# Patient Record
Sex: MALE | Race: WHITE | NOT HISPANIC OR LATINO
[De-identification: names, ages, dates, MRNs, and addresses within clinical notes are randomized per-mention and may not be internally consistent; named-entity substitution may affect disease eponyms.]

---

## 2017-12-28 PROBLEM — Z00.00 ENCOUNTER FOR PREVENTIVE HEALTH EXAMINATION: Status: ACTIVE | Noted: 2017-12-28

## 2018-01-03 ENCOUNTER — APPOINTMENT (OUTPATIENT)
Dept: UROLOGY | Facility: CLINIC | Age: 48
End: 2018-01-03
Payer: MEDICAID

## 2018-01-03 VITALS
TEMPERATURE: 97.5 F | RESPIRATION RATE: 17 BRPM | DIASTOLIC BLOOD PRESSURE: 82 MMHG | OXYGEN SATURATION: 96 % | HEIGHT: 70 IN | HEART RATE: 88 BPM | WEIGHT: 280 LBS | BODY MASS INDEX: 40.09 KG/M2 | SYSTOLIC BLOOD PRESSURE: 130 MMHG

## 2018-01-03 DIAGNOSIS — F20.1 DISORGANIZED SCHIZOPHRENIA: ICD-10-CM

## 2018-01-03 DIAGNOSIS — F17.200 NICOTINE DEPENDENCE, UNSPECIFIED, UNCOMPLICATED: ICD-10-CM

## 2018-01-03 DIAGNOSIS — Z78.9 OTHER SPECIFIED HEALTH STATUS: ICD-10-CM

## 2018-01-03 DIAGNOSIS — Z80.1 FAMILY HISTORY OF MALIGNANT NEOPLASM OF TRACHEA, BRONCHUS AND LUNG: ICD-10-CM

## 2018-01-03 DIAGNOSIS — Z80.3 FAMILY HISTORY OF MALIGNANT NEOPLASM OF BREAST: ICD-10-CM

## 2018-01-03 PROCEDURE — 99213 OFFICE O/P EST LOW 20 MIN: CPT

## 2018-01-11 PROBLEM — Z80.1 FAMILY HISTORY OF LUNG CANCER: Status: ACTIVE | Noted: 2018-01-03

## 2018-01-11 PROBLEM — F20.1 DISORGANIZED SCHIZOPHRENIA IN REMISSION: Status: RESOLVED | Noted: 2018-01-03 | Resolved: 2018-01-11

## 2018-01-11 PROBLEM — F17.200 CURRENT SOME DAY SMOKER: Status: ACTIVE | Noted: 2018-01-03

## 2018-01-11 PROBLEM — Z80.3 FAMILY HISTORY OF MALIGNANT NEOPLASM OF BREAST: Status: ACTIVE | Noted: 2018-01-03

## 2018-01-11 PROBLEM — Z78.9 CONSUMES ALCOHOL OCCASIONALLY: Status: ACTIVE | Noted: 2018-01-03

## 2018-01-25 ENCOUNTER — MEDICATION RENEWAL (OUTPATIENT)
Age: 48
End: 2018-01-25

## 2018-04-04 ENCOUNTER — APPOINTMENT (OUTPATIENT)
Dept: UROLOGY | Facility: CLINIC | Age: 48
End: 2018-04-04
Payer: MEDICAID

## 2018-04-04 VITALS
OXYGEN SATURATION: 98 % | DIASTOLIC BLOOD PRESSURE: 88 MMHG | HEART RATE: 84 BPM | SYSTOLIC BLOOD PRESSURE: 144 MMHG | RESPIRATION RATE: 16 BRPM

## 2018-04-04 PROCEDURE — 99213 OFFICE O/P EST LOW 20 MIN: CPT

## 2018-05-07 ENCOUNTER — APPOINTMENT (OUTPATIENT)
Dept: ORTHOPEDIC SURGERY | Facility: CLINIC | Age: 48
End: 2018-05-07

## 2018-06-04 ENCOUNTER — APPOINTMENT (OUTPATIENT)
Dept: ORTHOPEDIC SURGERY | Facility: CLINIC | Age: 48
End: 2018-06-04
Payer: MEDICAID

## 2018-06-04 VITALS
BODY MASS INDEX: 40.8 KG/M2 | HEART RATE: 105 BPM | SYSTOLIC BLOOD PRESSURE: 139 MMHG | DIASTOLIC BLOOD PRESSURE: 83 MMHG | WEIGHT: 285 LBS | HEIGHT: 70 IN

## 2018-06-04 PROCEDURE — 99204 OFFICE O/P NEW MOD 45 MIN: CPT | Mod: 25

## 2018-06-04 PROCEDURE — 20611 DRAIN/INJ JOINT/BURSA W/US: CPT | Mod: RT

## 2018-06-04 PROCEDURE — 73564 X-RAY EXAM KNEE 4 OR MORE: CPT | Mod: RT

## 2018-06-04 RX ORDER — METRONIDAZOLE 500 MG/1
TABLET ORAL
Refills: 0 | Status: ACTIVE | COMMUNITY

## 2018-06-18 ENCOUNTER — MEDICATION RENEWAL (OUTPATIENT)
Age: 48
End: 2018-06-18

## 2018-07-01 ENCOUNTER — OUTPATIENT (OUTPATIENT)
Dept: OUTPATIENT SERVICES | Facility: HOSPITAL | Age: 48
LOS: 1 days | End: 2018-07-01
Payer: MEDICAID

## 2018-07-11 ENCOUNTER — APPOINTMENT (OUTPATIENT)
Dept: ORTHOPEDIC SURGERY | Facility: CLINIC | Age: 48
End: 2018-07-11

## 2018-07-16 DIAGNOSIS — Z71.89 OTHER SPECIFIED COUNSELING: ICD-10-CM

## 2018-09-01 ENCOUNTER — OUTPATIENT (OUTPATIENT)
Dept: OUTPATIENT SERVICES | Facility: HOSPITAL | Age: 48
LOS: 1 days | End: 2018-09-01

## 2018-10-05 ENCOUNTER — APPOINTMENT (OUTPATIENT)
Dept: UROLOGY | Facility: CLINIC | Age: 48
End: 2018-10-05

## 2018-10-26 ENCOUNTER — APPOINTMENT (OUTPATIENT)
Dept: UROLOGY | Facility: CLINIC | Age: 48
End: 2018-10-26

## 2018-11-02 ENCOUNTER — INPATIENT (INPATIENT)
Facility: HOSPITAL | Age: 48
LOS: 20 days | Discharge: HOME | End: 2018-11-23
Attending: PSYCHIATRY & NEUROLOGY | Admitting: PSYCHIATRY & NEUROLOGY
Payer: MEDICAID

## 2018-11-02 VITALS
OXYGEN SATURATION: 99 % | HEART RATE: 93 BPM | TEMPERATURE: 98 F | RESPIRATION RATE: 18 BRPM | SYSTOLIC BLOOD PRESSURE: 144 MMHG | DIASTOLIC BLOOD PRESSURE: 79 MMHG

## 2018-11-02 DIAGNOSIS — F20.5 RESIDUAL SCHIZOPHRENIA: ICD-10-CM

## 2018-11-02 DIAGNOSIS — R69 ILLNESS, UNSPECIFIED: ICD-10-CM

## 2018-11-02 DIAGNOSIS — F25.9 SCHIZOAFFECTIVE DISORDER, UNSPECIFIED: ICD-10-CM

## 2018-11-02 LAB
ALBUMIN SERPL ELPH-MCNC: 4.9 G/DL — SIGNIFICANT CHANGE UP (ref 3.3–5)
ALP SERPL-CCNC: 101 U/L — SIGNIFICANT CHANGE UP (ref 40–120)
ALT FLD-CCNC: 34 U/L — SIGNIFICANT CHANGE UP (ref 4–41)
AMPHET UR-MCNC: NEGATIVE — SIGNIFICANT CHANGE UP
APAP SERPL-MCNC: < 15 UG/ML — LOW (ref 15–25)
APPEARANCE UR: CLEAR — SIGNIFICANT CHANGE UP
AST SERPL-CCNC: 25 U/L — SIGNIFICANT CHANGE UP (ref 4–40)
BARBITURATES UR SCN-MCNC: NEGATIVE — SIGNIFICANT CHANGE UP
BASOPHILS # BLD AUTO: 0.08 K/UL — SIGNIFICANT CHANGE UP (ref 0–0.2)
BASOPHILS NFR BLD AUTO: 1 % — SIGNIFICANT CHANGE UP (ref 0–2)
BENZODIAZ UR-MCNC: NEGATIVE — SIGNIFICANT CHANGE UP
BILIRUB SERPL-MCNC: 0.4 MG/DL — SIGNIFICANT CHANGE UP (ref 0.2–1.2)
BILIRUB UR-MCNC: NEGATIVE — SIGNIFICANT CHANGE UP
BLOOD UR QL VISUAL: NEGATIVE — SIGNIFICANT CHANGE UP
BUN SERPL-MCNC: 11 MG/DL — SIGNIFICANT CHANGE UP (ref 7–23)
CALCIUM SERPL-MCNC: 10.3 MG/DL — SIGNIFICANT CHANGE UP (ref 8.4–10.5)
CANNABINOIDS UR-MCNC: NEGATIVE — SIGNIFICANT CHANGE UP
CHLORIDE SERPL-SCNC: 101 MMOL/L — SIGNIFICANT CHANGE UP (ref 98–107)
CO2 SERPL-SCNC: 22 MMOL/L — SIGNIFICANT CHANGE UP (ref 22–31)
COCAINE METAB.OTHER UR-MCNC: NEGATIVE — SIGNIFICANT CHANGE UP
COLOR SPEC: SIGNIFICANT CHANGE UP
CREAT SERPL-MCNC: 0.77 MG/DL — SIGNIFICANT CHANGE UP (ref 0.5–1.3)
EOSINOPHIL # BLD AUTO: 0.12 K/UL — SIGNIFICANT CHANGE UP (ref 0–0.5)
EOSINOPHIL NFR BLD AUTO: 1.5 % — SIGNIFICANT CHANGE UP (ref 0–6)
ETHANOL BLD-MCNC: < 10 MG/DL — SIGNIFICANT CHANGE UP
GLUCOSE SERPL-MCNC: 91 MG/DL — SIGNIFICANT CHANGE UP (ref 70–99)
GLUCOSE UR-MCNC: NEGATIVE — SIGNIFICANT CHANGE UP
HCT VFR BLD CALC: 45 % — SIGNIFICANT CHANGE UP (ref 39–50)
HGB BLD-MCNC: 15.2 G/DL — SIGNIFICANT CHANGE UP (ref 13–17)
IMM GRANULOCYTES # BLD AUTO: 0.02 # — SIGNIFICANT CHANGE UP
IMM GRANULOCYTES NFR BLD AUTO: 0.3 % — SIGNIFICANT CHANGE UP (ref 0–1.5)
KETONES UR-MCNC: NEGATIVE — SIGNIFICANT CHANGE UP
LEUKOCYTE ESTERASE UR-ACNC: NEGATIVE — SIGNIFICANT CHANGE UP
LYMPHOCYTES # BLD AUTO: 2.21 K/UL — SIGNIFICANT CHANGE UP (ref 1–3.3)
LYMPHOCYTES # BLD AUTO: 28.4 % — SIGNIFICANT CHANGE UP (ref 13–44)
MCHC RBC-ENTMCNC: 28 PG — SIGNIFICANT CHANGE UP (ref 27–34)
MCHC RBC-ENTMCNC: 33.8 % — SIGNIFICANT CHANGE UP (ref 32–36)
MCV RBC AUTO: 83 FL — SIGNIFICANT CHANGE UP (ref 80–100)
METHADONE UR-MCNC: NEGATIVE — SIGNIFICANT CHANGE UP
MONOCYTES # BLD AUTO: 0.53 K/UL — SIGNIFICANT CHANGE UP (ref 0–0.9)
MONOCYTES NFR BLD AUTO: 6.8 % — SIGNIFICANT CHANGE UP (ref 2–14)
NEUTROPHILS # BLD AUTO: 4.81 K/UL — SIGNIFICANT CHANGE UP (ref 1.8–7.4)
NEUTROPHILS NFR BLD AUTO: 62 % — SIGNIFICANT CHANGE UP (ref 43–77)
NITRITE UR-MCNC: NEGATIVE — SIGNIFICANT CHANGE UP
NRBC # FLD: 0 — SIGNIFICANT CHANGE UP
OPIATES UR-MCNC: NEGATIVE — SIGNIFICANT CHANGE UP
OXYCODONE UR-MCNC: NEGATIVE — SIGNIFICANT CHANGE UP
PCP UR-MCNC: NEGATIVE — SIGNIFICANT CHANGE UP
PH UR: 6 — SIGNIFICANT CHANGE UP (ref 5–8)
PLATELET # BLD AUTO: 229 K/UL — SIGNIFICANT CHANGE UP (ref 150–400)
PMV BLD: 8.6 FL — SIGNIFICANT CHANGE UP (ref 7–13)
POTASSIUM SERPL-MCNC: 4 MMOL/L — SIGNIFICANT CHANGE UP (ref 3.5–5.3)
POTASSIUM SERPL-SCNC: 4 MMOL/L — SIGNIFICANT CHANGE UP (ref 3.5–5.3)
PROT SERPL-MCNC: 8.2 G/DL — SIGNIFICANT CHANGE UP (ref 6–8.3)
PROT UR-MCNC: NEGATIVE — SIGNIFICANT CHANGE UP
RBC # BLD: 5.42 M/UL — SIGNIFICANT CHANGE UP (ref 4.2–5.8)
RBC # FLD: 13 % — SIGNIFICANT CHANGE UP (ref 10.3–14.5)
SALICYLATES SERPL-MCNC: < 5 MG/DL — LOW (ref 15–30)
SODIUM SERPL-SCNC: 139 MMOL/L — SIGNIFICANT CHANGE UP (ref 135–145)
SP GR SPEC: 1.01 — SIGNIFICANT CHANGE UP (ref 1–1.04)
TSH SERPL-MCNC: 1.57 UIU/ML — SIGNIFICANT CHANGE UP (ref 0.27–4.2)
UROBILINOGEN FLD QL: NORMAL — SIGNIFICANT CHANGE UP
WBC # BLD: 7.77 K/UL — SIGNIFICANT CHANGE UP (ref 3.8–10.5)
WBC # FLD AUTO: 7.77 K/UL — SIGNIFICANT CHANGE UP (ref 3.8–10.5)

## 2018-11-02 PROCEDURE — 99285 EMERGENCY DEPT VISIT HI MDM: CPT

## 2018-11-02 RX ORDER — IBUPROFEN 200 MG
400 TABLET ORAL ONCE
Qty: 0 | Refills: 0 | Status: COMPLETED | OUTPATIENT
Start: 2018-11-02 | End: 2018-11-02

## 2018-11-02 RX ORDER — ALBUTEROL 90 UG/1
2 AEROSOL, METERED ORAL EVERY 6 HOURS
Qty: 0 | Refills: 0 | Status: DISCONTINUED | OUTPATIENT
Start: 2018-11-02 | End: 2018-11-23

## 2018-11-02 RX ORDER — OXYBUTYNIN CHLORIDE 5 MG
5 TABLET ORAL AT BEDTIME
Qty: 0 | Refills: 0 | Status: DISCONTINUED | OUTPATIENT
Start: 2018-11-02 | End: 2018-11-23

## 2018-11-02 RX ADMIN — Medication 5 MILLIGRAM(S): at 21:25

## 2018-11-02 RX ADMIN — Medication 400 MILLIGRAM(S): at 23:53

## 2018-11-02 RX ADMIN — Medication 400 MILLIGRAM(S): at 22:30
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
## 2018-11-02 NOTE — ED ADULT NURSE NOTE - OBJECTIVE STATEMENT
Patient received in  c/o aggressive behavior and AH. Patient denies SI, when asked about HI patient stated "I just wanna hurt him, and I keep having that feeling". Patient denies ETOH or substance use. psych eval ongoing
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
## 2018-11-02 NOTE — ED BEHAVIORAL HEALTH ASSESSMENT NOTE - DESCRIPTION
cooperative, in behavioral control    Vital Signs Last 24 Hrs  T(C): 36.7 (02 Nov 2018 12:03), Max: 36.7 (02 Nov 2018 12:03)  T(F): 98 (02 Nov 2018 12:03), Max: 98 (02 Nov 2018 12:03)  HR: 93 (02 Nov 2018 12:03) (93 - 93)  BP: 144/79 (02 Nov 2018 12:03) (144/79 - 144/79)  BP(mean): --  RR: 18 (02 Nov 2018 12:03) (18 - 18)  SpO2: 99% (02 Nov 2018 12:03) (99% - 99%) asthma see HPI
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
## 2018-11-02 NOTE — ED BEHAVIORAL HEALTH ASSESSMENT NOTE - SUICIDE RISK FACTORS
Agitation/severe anxiety/Access to means (pills, firearms, etc.)/Unable to engage in safety planning

## 2018-11-02 NOTE — ED PROVIDER NOTE - CPE EDP GASTRO NORM
Talked with patient and informed the below information. Patient stated she will wait to schedule with Dr. Dominguez when he gets back on 10/16/2017. No further questions or concerns.   normal...
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
## 2018-11-03 PROCEDURE — 99222 1ST HOSP IP/OBS MODERATE 55: CPT

## 2018-11-03 RX ORDER — DOCUSATE SODIUM 100 MG
100 CAPSULE ORAL
Qty: 0 | Refills: 0 | Status: DISCONTINUED | OUTPATIENT
Start: 2018-11-03 | End: 2018-11-23

## 2018-11-03 RX ORDER — SENNA PLUS 8.6 MG/1
1 TABLET ORAL
Qty: 0 | Refills: 0 | Status: DISCONTINUED | OUTPATIENT
Start: 2018-11-03 | End: 2018-11-23

## 2018-11-03 RX ORDER — ARIPIPRAZOLE 15 MG/1
5 TABLET ORAL DAILY
Qty: 0 | Refills: 0 | Status: DISCONTINUED | OUTPATIENT
Start: 2018-11-03 | End: 2018-11-07

## 2018-11-03 RX ADMIN — Medication 100 MILLIGRAM(S): at 09:10

## 2018-11-03 RX ADMIN — SENNA PLUS 1 TABLET(S): 8.6 TABLET ORAL at 09:11

## 2018-11-03 RX ADMIN — Medication 5 MILLIGRAM(S): at 21:42

## 2018-11-04 PROCEDURE — 99232 SBSQ HOSP IP/OBS MODERATE 35: CPT

## 2018-11-04 RX ADMIN — Medication 5 MILLIGRAM(S): at 21:02

## 2018-11-04 RX ADMIN — ARIPIPRAZOLE 5 MILLIGRAM(S): 15 TABLET ORAL at 08:52

## 2018-11-05 RX ADMIN — Medication 5 MILLIGRAM(S): at 20:34

## 2018-11-05 RX ADMIN — ARIPIPRAZOLE 5 MILLIGRAM(S): 15 TABLET ORAL at 08:45

## 2018-11-06 RX ORDER — ASENAPINE MALEATE 10 MG/1
5 TABLET SUBLINGUAL
Qty: 0 | Refills: 0 | Status: DISCONTINUED | OUTPATIENT
Start: 2018-11-06 | End: 2018-11-09

## 2018-11-06 RX ORDER — POLYETHYLENE GLYCOL 3350 17 G/17G
17 POWDER, FOR SOLUTION ORAL DAILY
Qty: 0 | Refills: 0 | Status: DISCONTINUED | OUTPATIENT
Start: 2018-11-06 | End: 2018-11-23

## 2018-11-06 RX ORDER — IBUPROFEN 200 MG
400 TABLET ORAL EVERY 6 HOURS
Qty: 0 | Refills: 0 | Status: DISCONTINUED | OUTPATIENT
Start: 2018-11-06 | End: 2018-11-08

## 2018-11-06 RX ORDER — IBUPROFEN 200 MG
400 TABLET ORAL EVERY 6 HOURS
Qty: 0 | Refills: 0 | Status: DISCONTINUED | OUTPATIENT
Start: 2018-11-06 | End: 2018-11-06

## 2018-11-06 RX ORDER — PHENYLEPHRINE-SHARK LIVER OIL-MINERAL OIL-PETROLATUM RECTAL OINTMENT
1 OINTMENT (GRAM) RECTAL
Qty: 0 | Refills: 0 | Status: DISCONTINUED | OUTPATIENT
Start: 2018-11-06 | End: 2018-11-23

## 2018-11-06 RX ADMIN — ARIPIPRAZOLE 5 MILLIGRAM(S): 15 TABLET ORAL at 09:00

## 2018-11-06 RX ADMIN — Medication 5 MILLIGRAM(S): at 20:23

## 2018-11-06 RX ADMIN — Medication 100 MILLIGRAM(S): at 09:24

## 2018-11-06 RX ADMIN — ASENAPINE MALEATE 5 MILLIGRAM(S): 10 TABLET SUBLINGUAL at 20:23

## 2018-11-06 RX ADMIN — POLYETHYLENE GLYCOL 3350 17 GRAM(S): 17 POWDER, FOR SOLUTION ORAL at 14:44

## 2018-11-07 ENCOUNTER — APPOINTMENT (OUTPATIENT)
Dept: UROLOGY | Facility: CLINIC | Age: 48
End: 2018-11-07

## 2018-11-07 RX ORDER — LITHIUM CARBONATE 300 MG/1
300 TABLET, EXTENDED RELEASE ORAL AT BEDTIME
Qty: 0 | Refills: 0 | Status: DISCONTINUED | OUTPATIENT
Start: 2018-11-07 | End: 2018-11-09

## 2018-11-07 RX ADMIN — LITHIUM CARBONATE 300 MILLIGRAM(S): 300 TABLET, EXTENDED RELEASE ORAL at 20:20

## 2018-11-07 RX ADMIN — POLYETHYLENE GLYCOL 3350 17 GRAM(S): 17 POWDER, FOR SOLUTION ORAL at 08:21

## 2018-11-07 RX ADMIN — Medication 5 MILLIGRAM(S): at 20:20

## 2018-11-07 RX ADMIN — Medication 100 MILLIGRAM(S): at 08:21

## 2018-11-07 RX ADMIN — ASENAPINE MALEATE 5 MILLIGRAM(S): 10 TABLET SUBLINGUAL at 20:20

## 2018-11-07 RX ADMIN — ARIPIPRAZOLE 5 MILLIGRAM(S): 15 TABLET ORAL at 08:20

## 2018-11-08 RX ORDER — SULINDAC 200 MG/1
150 TABLET ORAL EVERY 12 HOURS
Qty: 0 | Refills: 0 | Status: DISCONTINUED | OUTPATIENT
Start: 2018-11-08 | End: 2018-11-09

## 2018-11-08 RX ORDER — DIPHENHYDRAMINE HCL 50 MG
50 CAPSULE ORAL ONCE
Qty: 0 | Refills: 0 | Status: COMPLETED | OUTPATIENT
Start: 2018-11-08 | End: 2018-11-08

## 2018-11-08 RX ADMIN — Medication 5 MILLIGRAM(S): at 20:07

## 2018-11-08 RX ADMIN — ASENAPINE MALEATE 5 MILLIGRAM(S): 10 TABLET SUBLINGUAL at 20:06

## 2018-11-08 RX ADMIN — SULINDAC 150 MILLIGRAM(S): 200 TABLET ORAL at 12:42

## 2018-11-08 RX ADMIN — Medication 50 MILLIGRAM(S): at 23:23

## 2018-11-08 RX ADMIN — LITHIUM CARBONATE 300 MILLIGRAM(S): 300 TABLET, EXTENDED RELEASE ORAL at 20:07

## 2018-11-09 RX ORDER — ASENAPINE MALEATE 10 MG/1
10 TABLET SUBLINGUAL
Qty: 0 | Refills: 0 | Status: DISCONTINUED | OUTPATIENT
Start: 2018-11-09 | End: 2018-11-15

## 2018-11-09 RX ORDER — VALPROIC ACID (AS SODIUM SALT) 250 MG/5ML
250 SOLUTION, ORAL ORAL AT BEDTIME
Qty: 0 | Refills: 0 | Status: DISCONTINUED | OUTPATIENT
Start: 2018-11-09 | End: 2018-11-13

## 2018-11-09 RX ORDER — IBUPROFEN 200 MG
400 TABLET ORAL EVERY 6 HOURS
Qty: 0 | Refills: 0 | Status: DISCONTINUED | OUTPATIENT
Start: 2018-11-09 | End: 2018-11-23

## 2018-11-09 RX ADMIN — ASENAPINE MALEATE 10 MILLIGRAM(S): 10 TABLET SUBLINGUAL at 20:28

## 2018-11-09 RX ADMIN — Medication 5 MILLIGRAM(S): at 20:28

## 2018-11-09 RX ADMIN — Medication 250 MILLIGRAM(S): at 20:28

## 2018-11-10 RX ADMIN — PHENYLEPHRINE-SHARK LIVER OIL-MINERAL OIL-PETROLATUM RECTAL OINTMENT 1 APPLICATION(S): at 21:43

## 2018-11-10 RX ADMIN — Medication 5 MILLIGRAM(S): at 21:43

## 2018-11-10 RX ADMIN — PHENYLEPHRINE-SHARK LIVER OIL-MINERAL OIL-PETROLATUM RECTAL OINTMENT 1 APPLICATION(S): at 10:32

## 2018-11-10 RX ADMIN — Medication 400 MILLIGRAM(S): at 11:20

## 2018-11-10 RX ADMIN — Medication 250 MILLIGRAM(S): at 21:43

## 2018-11-10 RX ADMIN — ASENAPINE MALEATE 10 MILLIGRAM(S): 10 TABLET SUBLINGUAL at 21:43

## 2018-11-11 RX ADMIN — Medication 400 MILLIGRAM(S): at 18:01

## 2018-11-11 RX ADMIN — PHENYLEPHRINE-SHARK LIVER OIL-MINERAL OIL-PETROLATUM RECTAL OINTMENT 1 APPLICATION(S): at 11:14

## 2018-11-11 RX ADMIN — Medication 5 MILLIGRAM(S): at 21:29

## 2018-11-11 RX ADMIN — Medication 250 MILLIGRAM(S): at 21:29

## 2018-11-11 RX ADMIN — PHENYLEPHRINE-SHARK LIVER OIL-MINERAL OIL-PETROLATUM RECTAL OINTMENT 1 APPLICATION(S): at 21:29

## 2018-11-11 RX ADMIN — ASENAPINE MALEATE 10 MILLIGRAM(S): 10 TABLET SUBLINGUAL at 21:29

## 2018-11-12 RX ADMIN — Medication 250 MILLIGRAM(S): at 20:51

## 2018-11-12 RX ADMIN — Medication 5 MILLIGRAM(S): at 20:50

## 2018-11-12 RX ADMIN — ASENAPINE MALEATE 10 MILLIGRAM(S): 10 TABLET SUBLINGUAL at 20:50

## 2018-11-13 RX ORDER — VALPROIC ACID (AS SODIUM SALT) 250 MG/5ML
250 SOLUTION, ORAL ORAL DAILY
Qty: 0 | Refills: 0 | Status: DISCONTINUED | OUTPATIENT
Start: 2018-11-13 | End: 2018-11-14

## 2018-11-13 RX ORDER — ASENAPINE MALEATE 10 MG/1
5 TABLET SUBLINGUAL
Qty: 0 | Refills: 0 | Status: DISCONTINUED | OUTPATIENT
Start: 2018-11-13 | End: 2018-11-15

## 2018-11-13 RX ADMIN — ASENAPINE MALEATE 10 MILLIGRAM(S): 10 TABLET SUBLINGUAL at 21:17

## 2018-11-13 RX ADMIN — Medication 5 MILLIGRAM(S): at 21:17

## 2018-11-14 RX ORDER — DIPHENHYDRAMINE HCL 50 MG
50 CAPSULE ORAL ONCE
Qty: 0 | Refills: 0 | Status: COMPLETED | OUTPATIENT
Start: 2018-11-14 | End: 2018-11-14

## 2018-11-14 RX ORDER — DIVALPROEX SODIUM 500 MG/1
250 TABLET, DELAYED RELEASE ORAL DAILY
Qty: 0 | Refills: 0 | Status: DISCONTINUED | OUTPATIENT
Start: 2018-11-14 | End: 2018-11-16

## 2018-11-14 RX ADMIN — Medication 5 MILLIGRAM(S): at 20:26

## 2018-11-14 RX ADMIN — ASENAPINE MALEATE 5 MILLIGRAM(S): 10 TABLET SUBLINGUAL at 08:23

## 2018-11-14 RX ADMIN — Medication 100 MILLIGRAM(S): at 08:22

## 2018-11-14 RX ADMIN — ASENAPINE MALEATE 10 MILLIGRAM(S): 10 TABLET SUBLINGUAL at 20:26

## 2018-11-14 RX ADMIN — Medication 50 MILLIGRAM(S): at 23:20

## 2018-11-15 RX ORDER — ASENAPINE MALEATE 10 MG/1
10 TABLET SUBLINGUAL
Qty: 0 | Refills: 0 | Status: COMPLETED | OUTPATIENT
Start: 2018-11-15 | End: 2018-11-15

## 2018-11-15 RX ORDER — ASENAPINE MALEATE 10 MG/1
15 TABLET SUBLINGUAL
Qty: 0 | Refills: 0 | Status: DISCONTINUED | OUTPATIENT
Start: 2018-11-16 | End: 2018-11-23

## 2018-11-15 RX ADMIN — ASENAPINE MALEATE 10 MILLIGRAM(S): 10 TABLET SUBLINGUAL at 21:09

## 2018-11-15 RX ADMIN — DIVALPROEX SODIUM 250 MILLIGRAM(S): 500 TABLET, DELAYED RELEASE ORAL at 08:53

## 2018-11-15 RX ADMIN — Medication 5 MILLIGRAM(S): at 21:09

## 2018-11-15 RX ADMIN — ASENAPINE MALEATE 5 MILLIGRAM(S): 10 TABLET SUBLINGUAL at 08:53

## 2018-11-16 RX ORDER — DIVALPROEX SODIUM 500 MG/1
500 TABLET, DELAYED RELEASE ORAL AT BEDTIME
Qty: 0 | Refills: 0 | Status: DISCONTINUED | OUTPATIENT
Start: 2018-11-16 | End: 2018-11-17

## 2018-11-16 RX ADMIN — PHENYLEPHRINE-SHARK LIVER OIL-MINERAL OIL-PETROLATUM RECTAL OINTMENT 1 APPLICATION(S): at 20:37

## 2018-11-16 RX ADMIN — DIVALPROEX SODIUM 250 MILLIGRAM(S): 500 TABLET, DELAYED RELEASE ORAL at 08:44

## 2018-11-16 RX ADMIN — DIVALPROEX SODIUM 500 MILLIGRAM(S): 500 TABLET, DELAYED RELEASE ORAL at 20:37

## 2018-11-16 RX ADMIN — ASENAPINE MALEATE 15 MILLIGRAM(S): 10 TABLET SUBLINGUAL at 20:37

## 2018-11-16 RX ADMIN — Medication 5 MILLIGRAM(S): at 20:37

## 2018-11-17 RX ORDER — DIVALPROEX SODIUM 500 MG/1
500 TABLET, DELAYED RELEASE ORAL DAILY
Qty: 0 | Refills: 0 | Status: DISCONTINUED | OUTPATIENT
Start: 2018-11-18 | End: 2018-11-19

## 2018-11-17 RX ORDER — DIVALPROEX SODIUM 500 MG/1
500 TABLET, DELAYED RELEASE ORAL ONCE
Qty: 0 | Refills: 0 | Status: COMPLETED | OUTPATIENT
Start: 2018-11-17 | End: 2018-11-17

## 2018-11-17 RX ADMIN — Medication 400 MILLIGRAM(S): at 09:07

## 2018-11-17 RX ADMIN — Medication 400 MILLIGRAM(S): at 09:06

## 2018-11-17 RX ADMIN — Medication 5 MILLIGRAM(S): at 21:12

## 2018-11-17 RX ADMIN — DIVALPROEX SODIUM 500 MILLIGRAM(S): 500 TABLET, DELAYED RELEASE ORAL at 12:49

## 2018-11-17 RX ADMIN — ASENAPINE MALEATE 15 MILLIGRAM(S): 10 TABLET SUBLINGUAL at 21:12

## 2018-11-18 RX ADMIN — Medication 5 MILLIGRAM(S): at 20:53

## 2018-11-18 RX ADMIN — POLYETHYLENE GLYCOL 3350 17 GRAM(S): 17 POWDER, FOR SOLUTION ORAL at 09:49

## 2018-11-18 RX ADMIN — DIVALPROEX SODIUM 500 MILLIGRAM(S): 500 TABLET, DELAYED RELEASE ORAL at 09:48

## 2018-11-18 RX ADMIN — ASENAPINE MALEATE 15 MILLIGRAM(S): 10 TABLET SUBLINGUAL at 20:53

## 2018-11-19 RX ORDER — DIVALPROEX SODIUM 500 MG/1
250 TABLET, DELAYED RELEASE ORAL DAILY
Qty: 0 | Refills: 0 | Status: DISCONTINUED | OUTPATIENT
Start: 2018-11-19 | End: 2018-11-23

## 2018-11-19 RX ADMIN — ASENAPINE MALEATE 15 MILLIGRAM(S): 10 TABLET SUBLINGUAL at 22:00

## 2018-11-19 RX ADMIN — Medication 5 MILLIGRAM(S): at 22:00

## 2018-11-20 RX ADMIN — DIVALPROEX SODIUM 250 MILLIGRAM(S): 500 TABLET, DELAYED RELEASE ORAL at 10:42

## 2018-11-20 RX ADMIN — Medication 5 MILLIGRAM(S): at 21:17

## 2018-11-20 RX ADMIN — ASENAPINE MALEATE 15 MILLIGRAM(S): 10 TABLET SUBLINGUAL at 21:17

## 2018-11-21 RX ORDER — HYDROCORTISONE 1 %
1 OINTMENT (GRAM) TOPICAL DAILY
Qty: 0 | Refills: 0 | Status: DISCONTINUED | OUTPATIENT
Start: 2018-11-21 | End: 2018-11-23

## 2018-11-21 RX ADMIN — DIVALPROEX SODIUM 250 MILLIGRAM(S): 500 TABLET, DELAYED RELEASE ORAL at 09:13

## 2018-11-21 RX ADMIN — Medication 1 APPLICATION(S): at 09:42

## 2018-11-21 RX ADMIN — PHENYLEPHRINE-SHARK LIVER OIL-MINERAL OIL-PETROLATUM RECTAL OINTMENT 1 APPLICATION(S): at 09:13

## 2018-11-21 RX ADMIN — ASENAPINE MALEATE 15 MILLIGRAM(S): 10 TABLET SUBLINGUAL at 20:43

## 2018-11-22 RX ADMIN — PHENYLEPHRINE-SHARK LIVER OIL-MINERAL OIL-PETROLATUM RECTAL OINTMENT 1 APPLICATION(S): at 20:25

## 2018-11-22 RX ADMIN — DIVALPROEX SODIUM 250 MILLIGRAM(S): 500 TABLET, DELAYED RELEASE ORAL at 09:31

## 2018-11-22 RX ADMIN — Medication 5 MILLIGRAM(S): at 20:25

## 2018-11-22 RX ADMIN — ASENAPINE MALEATE 15 MILLIGRAM(S): 10 TABLET SUBLINGUAL at 20:25

## 2018-11-22 RX ADMIN — Medication 1 APPLICATION(S): at 09:31

## 2018-11-23 VITALS — TEMPERATURE: 98 F | DIASTOLIC BLOOD PRESSURE: 88 MMHG | HEART RATE: 83 BPM | SYSTOLIC BLOOD PRESSURE: 127 MMHG

## 2018-11-23 RX ORDER — DIVALPROEX SODIUM 500 MG/1
1 TABLET, DELAYED RELEASE ORAL
Qty: 30 | Refills: 0 | OUTPATIENT
Start: 2018-11-23 | End: 2018-12-22

## 2018-11-23 RX ORDER — DOCUSATE SODIUM 100 MG
1 CAPSULE ORAL
Qty: 60 | Refills: 0 | OUTPATIENT
Start: 2018-11-23 | End: 2018-12-22

## 2018-11-23 RX ORDER — ASENAPINE MALEATE 10 MG/1
3 TABLET SUBLINGUAL
Qty: 90 | Refills: 0 | OUTPATIENT
Start: 2018-11-23 | End: 2018-12-22

## 2018-11-23 RX ORDER — PHENYLEPHRINE-SHARK LIVER OIL-MINERAL OIL-PETROLATUM RECTAL OINTMENT
1 OINTMENT (GRAM) RECTAL
Qty: 0 | Refills: 0 | COMMUNITY
Start: 2018-11-23

## 2018-11-23 RX ORDER — SENNA PLUS 8.6 MG/1
1 TABLET ORAL
Qty: 0 | Refills: 0 | COMMUNITY
Start: 2018-11-23

## 2018-11-23 RX ORDER — PHENYLEPHRINE-SHARK LIVER OIL-MINERAL OIL-PETROLATUM RECTAL OINTMENT
1 OINTMENT (GRAM) RECTAL
Qty: 1 | Refills: 0 | OUTPATIENT
Start: 2018-11-23 | End: 2018-11-29

## 2018-11-23 RX ORDER — ALBUTEROL 90 UG/1
2 AEROSOL, METERED ORAL
Qty: 1 | Refills: 0 | OUTPATIENT
Start: 2018-11-23

## 2018-11-23 RX ORDER — OXYBUTYNIN CHLORIDE 5 MG
1 TABLET ORAL
Qty: 30 | Refills: 0 | OUTPATIENT
Start: 2018-11-23 | End: 2018-12-22

## 2018-11-23 RX ORDER — SENNA PLUS 8.6 MG/1
1 TABLET ORAL
Qty: 60 | Refills: 0 | OUTPATIENT
Start: 2018-11-23 | End: 2018-12-22

## 2018-11-23 RX ORDER — DOCUSATE SODIUM 100 MG
1 CAPSULE ORAL
Qty: 0 | Refills: 0 | COMMUNITY
Start: 2018-11-23

## 2018-11-23 RX ORDER — ALBUTEROL 90 UG/1
2 AEROSOL, METERED ORAL
Qty: 0 | Refills: 0 | COMMUNITY
Start: 2018-11-23

## 2018-11-23 RX ADMIN — POLYETHYLENE GLYCOL 3350 17 GRAM(S): 17 POWDER, FOR SOLUTION ORAL at 09:00

## 2018-11-23 RX ADMIN — DIVALPROEX SODIUM 250 MILLIGRAM(S): 500 TABLET, DELAYED RELEASE ORAL at 09:00

## 2018-12-05 ENCOUNTER — APPOINTMENT (OUTPATIENT)
Dept: ORTHOPEDIC SURGERY | Facility: CLINIC | Age: 48
End: 2018-12-05

## 2018-12-19 ENCOUNTER — APPOINTMENT (OUTPATIENT)
Dept: ORTHOPEDIC SURGERY | Facility: CLINIC | Age: 48
End: 2018-12-19
Payer: MEDICAID

## 2018-12-19 PROCEDURE — 99214 OFFICE O/P EST MOD 30 MIN: CPT

## 2018-12-19 RX ORDER — DICLOFENAC SODIUM 50 MG/1
50 TABLET, DELAYED RELEASE ORAL
Qty: 60 | Refills: 1 | Status: ACTIVE | COMMUNITY
Start: 2018-12-19 | End: 1900-01-01

## 2019-01-23 ENCOUNTER — APPOINTMENT (OUTPATIENT)
Dept: ORTHOPEDIC SURGERY | Facility: CLINIC | Age: 49
End: 2019-01-23
Payer: MEDICAID

## 2019-01-23 PROCEDURE — 99214 OFFICE O/P EST MOD 30 MIN: CPT

## 2019-01-23 NOTE — PHYSICAL EXAM
[de-identified] : Physical Examination\par General: well nourished, in no acute distress, alert and oriented to person, place and time\par Psychiatric: normal mood and affect, no abnormal movements or speech patterns\par Eyes: vision intact without glasses\par Throat: no thyromegaly\par Lymph: no enlarged nodes, no lymphedema in extremity\par Respiratory: no wheezing, no shortness of breath with ambulation\par Cardiac: no cardiac leg swelling, 2+ peripheral pulses\par Neurology: normal gross sensation in extremities to light touch\par Abdomen: soft, non-tender, tympanic, no masses\par \par Musculoskeletal Examination\par Ambulation	+ antalgic gait, - assistive devices\par \par Knee			Right			Left\par General\par      Swelling/Deformity	normal			normal	\par      Skin			normal			normal\par      Erythema		-			-\par      Standing Alignment	neutral			neutral\par      Effusion		none			none\par Range of Motion\par      Hip			full painless ROM		full painless ROM\par      Knee Flexion		110			110\par      Knee Extension	0			0\par Patella\par      J Sign		-			-\par      Quad Medial/Lateral	1/1 1/1\par      Apprehension		-			-\par      Varghese's		+			-\par      Grind Sign		+			-\par      Crepitus		+			-\par Palpation\par      Medial Joint Line	-			-\par      Medial Fem Condyle	-			-\par      Lateral Joint Line	+			-\par      Quad Tendon		-			-\par      Patella Tendon	-			-\par      Medial Patella		-			-\par      Lateral Patella 	-			-\par      Posterior Knee	-			-\par Ligamentous\par      Varus @ 0° / 30°	-/-			-/-\par      Valgus @ 0° / 30°	-/-			-/-\par      Lachman		-			-\par      Pivot Shift		-			-\par      Anterior Drawer	-			-\par      Posterior Drawer	-			-\par Meniscus\par      Felicia		+ pop lateral			-\par      Flexion Pinch		+ pain lateral			-\par Strength Examination/Atrophy\par      Hip Flexors 		5+			5+\par      Quadriceps		5+			5+\par      Hamstring		5+			5+\par      Tibialis Anterior	5+			5+\par      Achilles/Soleus	5+			5+\par Sensation\par      Deep Peroneal	normal			normal\par      Superficial Peroneal 	normal			normal\par      Sural  		normal			normal\par      Posterior Tibial 	normal			normal\par      Saphneous 		normal			normal\par Pulses\par      DP			2+			2+\par  [de-identified] : 5 views of the affected right knee (standing AP, flexing standing AP, 30degree flexed lateral, 0degree lateral, sunrise view)\par demonstrate:\par There is mild lateral weightbearing asymmetric narrowing\par Small to moderate osteophytic lipping\par Trace suprapatellar effusion\par Mild patellofemoral joint space loss without evidence of tilt or subluxation on sunrise view\par Normal soft tissue density\par Otherwise normal osseous bone structure without fracture or dislocation\par \par \par MRI Right knee from [facility] on 12/27-18\par My impression of the images:\par Quality of the MRI is ok\par Medial Meniscus ok\par Lateral Meniscus complex body tear\par There is moderate to severe focal chondral loss in the lateral tibial compartments\par There is marrow edema[/subchondral cysts] in the lateral tibial compartments\par LCL is intact\par MCL is intact\par ACL is intact\par PCL is intact \par Quadriceps Tendon is intact\par Patella Tendon is intact\par loose body anteriorly\par \par The Final Radiologist Impression:\par Ligaments: The anterior cruciate, posterior cruciate, medial collateral, and lateral\par collateral ligaments are intact.\par Menisci: Degeneration of the lateral meniscus is present with a horizontal tear involving\par most of the meniscus. A 0.0 cm loose body is present adjacent to the anterior horn lateral\par meniscus. Mild fraying of the free edge body medial meniscus is present.\par Extensor Mechanism: The quadriceps and patellar tendons are intact. The medial and lateral\par patellofemoral ligaments are unremarkable.\par Effusion: A small amount of knee joint fluid is present.\par Cartilage: Focal full-thickness cartilage loss is present in the lateral tibial plateau\par with associated subchondral cyst formation. Less severe cartilage loss is present in the\par patellofemoral and medial femorotibial compartments.\par Bone Marrow: Overall, the bone marrow signal is age-appropriate.\par Popliteal fossa: There is no significant popliteal cyst. Popliteus muscle and tendon are\par intact.\par Iliotibial band: The iliotibial band is within normal limits.\par Posterolateral corner: The posterior lateral corner is unremarkable.\par IMPRESSION:\par Tricompartmental degenerative changes, with cartilage loss most pronounced in the lateral\par femorotibial compartment. M17.11\par Degeneration of the lateral meniscus with associated horizontal tearing. Loose body\par adjacent to the anterior ligament lateral meniscus. M23.41\par Small knee joint effusion. M25.461\par \par

## 2019-01-23 NOTE — HISTORY OF PRESENT ILLNESS
[de-identified] : CC right knee\par Consult Dr Lopez \par \par HPI 48 yo male right HD presents for MRI review of chronic onset of 3 months of constant pain in the anterior and lateral right knee without injury. The pain is worse, and rated a 8 out of 10, described as "pain ", without radiation. Nothing makes the pain better and walking standing stairs makes the pain worse. The patient reports associated symptoms of swelling. The patient - pain at night affecting sleep, and - similar pain previously.\par \par The patient has tried the following treatments:\par Activity modification	+\par Ice/Compression  	+\par Braces    		-\par Nsaids    		+\par Physical Therapy 	+ no help\par Cortisone Injection	+ no help\par Arthroscopy		-\par \par not ready for surgery\par \par Review of Systems is positive for the above musculoskeletal symptoms and is otherwise non-contributory for general, constitutional, psychiatric, neurologic, HEENT, cardiac, respiratory, gastrointestinal, reproductive, lymphatic, and dermatologic complaints.\par \par

## 2019-01-23 NOTE — DISCUSSION/SUMMARY
[de-identified] : Right knee moderate lateral compartment osteoarthritis\par Right knee loose body\par Right knee lateral meniscus tear\par \par I discussed the nature of meniscal tears using models, diagrams and drawings as well as the mensci's function. The meniscus is a fibrocartilage that cushions and spreads the contact stresses between the femur and the tibia. It becomes less pliable and more prone to tearing with age. The torn meniscus can be painful. We discussed both the risks and benefits of both operative versus non-operative treatment. Non-operative treatment including activity modification, oral NSAIDS, therapy and corticosteroid injections can also be attempted. In patient failing non-operative conservative treatment, the definitive surgical treatment, depending upon manypatient factors, including but not limited to age, activity level, tear acuity, tear pattern, tissue quality, etc, is arthroscopic debridement versus repair. However, certain a certain subset of patients, they are candidates for a meniscal repair which should be done expediently to maximize reparability of the torn meniscus. There is a chance of progression of knee degenerative arthrosis with a meniscus tear due to the loss of function of the meniscus. Unfortunately there is frequently superimposed degenerative chondromalacia of the chondral surfaces in the knee. These symptoms are frequently not experienced preoperatively or are in addition to the meniscal symptoms. It is very difficult to differentiate the two clinically based on imaging studies, physical exam and history, therefore there are no guarantees as to the outcome, that ultimate improvement is largely based on the extent of degenerative chondromalacia present as well as the extent of meniscal pathology. The patient was instructed to avoid deep knee bends or squatting as this may exacerbate the knee's internal derangement.\par Discussed operative versus nonoperative management, patient is at this point on ready to proceed with operative management despite discussion and review the MRI. Multiple questions were answered including the relation to meniscus tears to glucosamine oral supplementation.\par Physical therapy prescribed for knee ROM exercises, quad/hamstring/vmo/hip abductor strengthening exercises, modalities PRN, home exercise program\par The patient was prescribed Diclofenac PO non-steroidal anti-inflammatory medication. 50mg tablets twice daily to be taken for at least 1-2 weeks in a row and then PRN afterwards. Risks and benefits were discussed and include but not limited to renal damage and GI ulceration and bleeding.  They were advised to take with food to limit stomach upset as well as warned to stop the medication if worsening gastric pain or dizziness or other side effects. Also to immediately stop the medication and seek appriate medical attention if any severe stomach ache, gastritis, black/red vomit, black/red stools or any other medical concern.\par \par Note given to patient to allow pain use at group home\par \par The patient verifies their understanding the the visit, diagnosis and plan. They agree with the treatment plan and will contact the office with any questions or problems.\par \par Follow up\par After completion of PT

## 2019-01-25 ENCOUNTER — APPOINTMENT (OUTPATIENT)
Dept: UROLOGY | Facility: CLINIC | Age: 49
End: 2019-01-25
Payer: MEDICAID

## 2019-01-25 PROCEDURE — 99213 OFFICE O/P EST LOW 20 MIN: CPT

## 2019-01-28 NOTE — HISTORY OF PRESENT ILLNESS
[FreeTextEntry1] : cc swelling on penis \par pt c/o redness swelling in penis last few weeks \par sexually active but only performs oral on others\par given clotrimazole by ed this week with some improvement \par urinary symptoms well controlled with oxybutinin

## 2019-01-28 NOTE — ASSESSMENT
[FreeTextEntry1] : oab cont oxybutinin\par \par balanitis \par cont clotrimazole\par abstain from masturbation until healed

## 2019-01-28 NOTE — PHYSICAL EXAM
[General Appearance - Well Developed] : well developed [General Appearance - Well Nourished] : well nourished [Normal Appearance] : normal appearance [Well Groomed] : well groomed [General Appearance - In No Acute Distress] : no acute distress [Abdomen Soft] : soft [Abdomen Tenderness] : non-tender [Costovertebral Angle Tenderness] : no ~M costovertebral angle tenderness [Urethral Meatus] : meatus normal [Urinary Bladder Findings] : the bladder was normal on palpation [Scrotum] : the scrotum was normal [Testes Mass (___cm)] : there were no testicular masses [Edema] : no peripheral edema [] : no respiratory distress [Respiration, Rhythm And Depth] : normal respiratory rhythm and effort [Exaggerated Use Of Accessory Muscles For Inspiration] : no accessory muscle use [Oriented To Time, Place, And Person] : oriented to person, place, and time [Affect] : the affect was normal [Mood] : the mood was normal [Not Anxious] : not anxious [Normal Station and Gait] : the gait and station were normal for the patient's age [No Focal Deficits] : no focal deficits [No Palpable Adenopathy] : no palpable adenopathy [FreeTextEntry1] : mild swelling and redness distal ventral shaft

## 2019-02-15 ENCOUNTER — APPOINTMENT (OUTPATIENT)
Dept: ORTHOPEDIC SURGERY | Facility: CLINIC | Age: 49
End: 2019-02-15
Payer: MEDICAID

## 2019-02-15 PROCEDURE — 99213 OFFICE O/P EST LOW 20 MIN: CPT

## 2019-02-15 NOTE — PHYSICAL EXAM
[de-identified] : Physical Examination\par General: well nourished, in no acute distress, alert and oriented to person, place and time\par Psychiatric: normal mood and affect, no abnormal movements or speech patterns\par Eyes: vision intact without glasses\par Throat: no thyromegaly\par Lymph: no enlarged nodes, no lymphedema in extremity\par Respiratory: no wheezing, no shortness of breath with ambulation\par Cardiac: no cardiac leg swelling, 2+ peripheral pulses\par Neurology: normal gross sensation in extremities to light touch\par Abdomen: soft, non-tender, tympanic, no masses\par \par Musculoskeletal Examination\par Ambulation	+ antalgic gait, - assistive devices\par \par Knee			Right			Left\par General\par      Swelling/Deformity	normal			normal	\par      Skin			normal			normal\par      Erythema		-			-\par      Standing Alignment	neutral			neutral\par      Effusion		none			none\par Range of Motion\par      Hip			full painless ROM		full painless ROM\par      Knee Flexion		110			110\par      Knee Extension	0			0\par Patella\par      J Sign		-			-\par      Quad Medial/Lateral	1/1 1/1\par      Apprehension		-			-\par      Varghese's		+			-\par      Grind Sign		+			-\par      Crepitus		+			-\par Palpation\par      Medial Joint Line	-			-\par      Medial Fem Condyle	-			-\par      Lateral Joint Line	+			-\par      Quad Tendon		-			-\par      Patella Tendon	-			-\par      Medial Patella		-			-\par      Lateral Patella 	-			-\par      Posterior Knee	-			-\par Ligamentous\par      Varus @ 0° / 30°	-/-			-/-\par      Valgus @ 0° / 30°	-/-			-/-\par      Lachman		-			-\par      Pivot Shift		-			-\par      Anterior Drawer	-			-\par      Posterior Drawer	-			-\par Meniscus\par      Felicia		+ pop lateral			-\par      Flexion Pinch		+ pain lateral			-\par Strength Examination/Atrophy\par      Hip Flexors 		5+			5+\par      Quadriceps		5+			5+\par      Hamstring		5+			5+\par      Tibialis Anterior	5+			5+\par      Achilles/Soleus	5+			5+\par Sensation\par      Deep Peroneal	normal			normal\par      Superficial Peroneal 	normal			normal\par      Sural  		normal			normal\par      Posterior Tibial 	normal			normal\par      Saphneous 		normal			normal\par Pulses\par      DP			2+			2+\par  [de-identified] : 5 views of the affected right knee (standing AP, flexing standing AP, 30degree flexed lateral, 0degree lateral, sunrise view)\par demonstrate:\par There is mild lateral weightbearing asymmetric narrowing\par Small to moderate osteophytic lipping\par Trace suprapatellar effusion\par Mild patellofemoral joint space loss without evidence of tilt or subluxation on sunrise view\par Normal soft tissue density\par Otherwise normal osseous bone structure without fracture or dislocation\par \par \par MRI Right knee from [facility] on 12/27-18\par My impression of the images:\par Quality of the MRI is ok\par Medial Meniscus ok\par Lateral Meniscus complex body tear\par There is moderate to severe focal chondral loss in the lateral tibial compartments\par There is marrow edema[/subchondral cysts] in the lateral tibial compartments\par LCL is intact\par MCL is intact\par ACL is intact\par PCL is intact \par Quadriceps Tendon is intact\par Patella Tendon is intact\par loose body anteriorly\par \par The Final Radiologist Impression:\par Ligaments: The anterior cruciate, posterior cruciate, medial collateral, and lateral\par collateral ligaments are intact.\par Menisci: Degeneration of the lateral meniscus is present with a horizontal tear involving\par most of the meniscus. A 0.0 cm loose body is present adjacent to the anterior horn lateral\par meniscus. Mild fraying of the free edge body medial meniscus is present.\par Extensor Mechanism: The quadriceps and patellar tendons are intact. The medial and lateral\par patellofemoral ligaments are unremarkable.\par Effusion: A small amount of knee joint fluid is present.\par Cartilage: Focal full-thickness cartilage loss is present in the lateral tibial plateau\par with associated subchondral cyst formation. Less severe cartilage loss is present in the\par patellofemoral and medial femorotibial compartments.\par Bone Marrow: Overall, the bone marrow signal is age-appropriate.\par Popliteal fossa: There is no significant popliteal cyst. Popliteus muscle and tendon are\par intact.\par Iliotibial band: The iliotibial band is within normal limits.\par Posterolateral corner: The posterior lateral corner is unremarkable.\par IMPRESSION:\par Tricompartmental degenerative changes, with cartilage loss most pronounced in the lateral\par femorotibial compartment. M17.11\par Degeneration of the lateral meniscus with associated horizontal tearing. Loose body\par adjacent to the anterior ligament lateral meniscus. M23.41\par Small knee joint effusion. M25.461\par \par

## 2019-02-15 NOTE — HISTORY OF PRESENT ILLNESS
[de-identified] : CC right knee\par Consult Dr Lopez \par \par HPI 46 yo male right HD presents for second MRI review of chronic onset of 3 months of constant pain in the anterior and lateral right knee without injury. The pain is worse, and rated a 8 out of 10, described as "pain ", without radiation. Nothing makes the pain better and walking standing stairs makes the pain worse. The patient reports associated symptoms of swelling. The patient - pain at night affecting sleep, and - similar pain previously.\par \par The patient has tried the following treatments:\par Activity modification	+\par Ice/Compression  	+\par Braces    		-\par Nsaids    		+\par Physical Therapy 	+ no help\par Cortisone Injection	+ no help\par Arthroscopy		-\par \par here with brother the caretaker and legal guardian\par \par Review of Systems is positive for the above musculoskeletal symptoms and is otherwise non-contributory for general, constitutional, psychiatric, neurologic, HEENT, cardiac, respiratory, gastrointestinal, reproductive, lymphatic, and dermatologic complaints.\par \par

## 2019-02-15 NOTE — DISCUSSION/SUMMARY
[de-identified] : Right knee moderate lateral compartment osteoarthritis\par Right knee loose body\par Right knee lateral meniscus tear\par \par I discussed the nature of meniscal tears using models, diagrams and drawings as well as the mensci's function. The meniscus is a fibrocartilage that cushions and spreads the contact stresses between the femur and the tibia. It becomes less pliable and more prone to tearing with age. The torn meniscus can be painful. We discussed both the risks and benefits of both operative versus non-operative treatment. Non-operative treatment including activity modification, oral NSAIDS, therapy and corticosteroid injections can also be attempted. In patient failing non-operative conservative treatment, the definitive surgical treatment, depending upon manypatient factors, including but not limited to age, activity level, tear acuity, tear pattern, tissue quality, etc, is arthroscopic debridement versus repair. However, certain a certain subset of patients, they are candidates for a meniscal repair which should be done expediently to maximize reparability of the torn meniscus. There is a chance of progression of knee degenerative arthrosis with a meniscus tear due to the loss of function of the meniscus. Unfortunately there is frequently superimposed degenerative chondromalacia of the chondral surfaces in the knee. These symptoms are frequently not experienced preoperatively or are in addition to the meniscal symptoms. It is very difficult to differentiate the two clinically based on imaging studies, physical exam and history, therefore there are no guarantees as to the outcome, that ultimate improvement is largely based on the extent of degenerative chondromalacia present as well as the extent of meniscal pathology. The patient was instructed to avoid deep knee bends or squatting as this may exacerbate the knee's internal derangement.\par \par Discussed operative versus nonoperative management, patient is at this point on ready to proceed with operative management despite discussion and review the MRI. Multiple questions were answered including the relation to meniscus tears to glucosamine oral supplementation.\par \par patient didn't do therapy yet since last visit. upset w approval process w insurance company\par \par continue prescribed Diclofenac PO non-steroidal anti-inflammatory medication. 50mg tablets twice daily to be taken for at least 1-2 weeks in a row and then PRN afterwards. Risks and benefits were discussed and include but not limited to renal damage and GI ulceration and bleeding.  They were advised to take with food to limit stomach upset as well as warned to stop the medication if worsening gastric pain or dizziness or other side effects. Also to immediately stop the medication and seek appriate medical attention if any severe stomach ache, gastritis, black/red vomit, black/red stools or any other medical concern.\par \par greater than 30 minuies spent discussing imaging, op vs non op options, postop course, long term expectaion, coodiation of care.\par \par The patient verifies their understanding the the visit, diagnosis and plan. They agree with the treatment plan and will contact the office with any questions or problems.\par \par Follow up\par when decision made

## 2019-06-17 ENCOUNTER — APPOINTMENT (OUTPATIENT)
Dept: ORTHOPEDIC SURGERY | Facility: CLINIC | Age: 49
End: 2019-06-17
Payer: MEDICAID

## 2019-06-17 DIAGNOSIS — M17.11 UNILATERAL PRIMARY OSTEOARTHRITIS, RIGHT KNEE: ICD-10-CM

## 2019-06-17 DIAGNOSIS — S83.271A COMPLEX TEAR OF LATERAL MENISCUS, CURRENT INJURY, RIGHT KNEE, INITIAL ENCOUNTER: ICD-10-CM

## 2019-06-17 PROCEDURE — 99212 OFFICE O/P EST SF 10 MIN: CPT

## 2019-06-17 NOTE — DISCUSSION/SUMMARY
[de-identified] : Right knee moderate lateral compartment osteoarthritis\par Right knee loose body\par Right knee lateral meniscus tear\par \par patient doesn;t want any management. note for above reasons provided\par \par The patient verifies their understanding the the visit, diagnosis and plan. They agree with the treatment plan and will contact the office with any questions or problems.\par \par Follow up\par none

## 2019-06-17 NOTE — PHYSICAL EXAM
[de-identified] : Physical Examination\par General: well nourished, in no acute distress, alert and oriented to person, place and time\par Psychiatric: normal mood and affect, no abnormal movements or speech patterns\par Eyes: vision intact without glasses\par Throat: no thyromegaly\par Lymph: no enlarged nodes, no lymphedema in extremity\par Respiratory: no wheezing, no shortness of breath with ambulation\par Cardiac: no cardiac leg swelling, 2+ peripheral pulses\par Neurology: normal gross sensation in extremities to light touch\par Abdomen: soft, non-tender, tympanic, no masses\par \par Musculoskeletal Examination\par Ambulation	+ antalgic gait, - assistive devices\par \par Knee			Right			Left\par General\par      Swelling/Deformity	normal			normal	\par      Skin			normal			normal\par      Erythema		-			-\par      Standing Alignment	neutral			neutral\par      Effusion		none			none\par Range of Motion\par      Hip			full painless ROM		full painless ROM\par      Knee Flexion		110			110\par      Knee Extension	0			0\par Patella\par      J Sign		-			-\par      Quad Medial/Lateral	1/1 1/1\par      Apprehension		-			-\par      Varghese's		+			-\par      Grind Sign		+			-\par      Crepitus		+			-\par Palpation\par      Medial Joint Line	-			-\par      Medial Fem Condyle	-			-\par      Lateral Joint Line	+			-\par      Quad Tendon		-			-\par      Patella Tendon	-			-\par      Medial Patella		-			-\par      Lateral Patella 	-			-\par      Posterior Knee	-			-\par Ligamentous\par      Varus @ 0° / 30°	-/-			-/-\par      Valgus @ 0° / 30°	-/-			-/-\par      Lachman		-			-\par      Pivot Shift		-			-\par      Anterior Drawer	-			-\par      Posterior Drawer	-			-\par Meniscus\par      Felicia		+ pop lateral			-\par      Flexion Pinch		+ pain lateral			-\par Strength Examination/Atrophy\par      Hip Flexors 		5+			5+\par      Quadriceps		5+			5+\par      Hamstring		5+			5+\par      Tibialis Anterior	5+			5+\par      Achilles/Soleus	5+			5+\par Sensation\par      Deep Peroneal	normal			normal\par      Superficial Peroneal 	normal			normal\par      Sural  		normal			normal\par      Posterior Tibial 	normal			normal\par      Saphneous 		normal			normal\par Pulses\par      DP			2+			2+\par  [de-identified] : 5 views of the affected right knee (standing AP, flexing standing AP, 30degree flexed lateral, 0degree lateral, sunrise view)\par demonstrate:\par There is mild lateral weightbearing asymmetric narrowing\par Small to moderate osteophytic lipping\par Trace suprapatellar effusion\par Mild patellofemoral joint space loss without evidence of tilt or subluxation on sunrise view\par Normal soft tissue density\par Otherwise normal osseous bone structure without fracture or dislocation\par \par \par MRI Right knee from [facility] on 12/27-18\par My impression of the images:\par Quality of the MRI is ok\par Medial Meniscus ok\par Lateral Meniscus complex body tear\par There is moderate to severe focal chondral loss in the lateral tibial compartments\par There is marrow edema[/subchondral cysts] in the lateral tibial compartments\par LCL is intact\par MCL is intact\par ACL is intact\par PCL is intact \par Quadriceps Tendon is intact\par Patella Tendon is intact\par loose body anteriorly\par \par The Final Radiologist Impression:\par Ligaments: The anterior cruciate, posterior cruciate, medial collateral, and lateral\par collateral ligaments are intact.\par Menisci: Degeneration of the lateral meniscus is present with a horizontal tear involving\par most of the meniscus. A 0.0 cm loose body is present adjacent to the anterior horn lateral\par meniscus. Mild fraying of the free edge body medial meniscus is present.\par Extensor Mechanism: The quadriceps and patellar tendons are intact. The medial and lateral\par patellofemoral ligaments are unremarkable.\par Effusion: A small amount of knee joint fluid is present.\par Cartilage: Focal full-thickness cartilage loss is present in the lateral tibial plateau\par with associated subchondral cyst formation. Less severe cartilage loss is present in the\par patellofemoral and medial femorotibial compartments.\par Bone Marrow: Overall, the bone marrow signal is age-appropriate.\par Popliteal fossa: There is no significant popliteal cyst. Popliteus muscle and tendon are\par intact.\par Iliotibial band: The iliotibial band is within normal limits.\par Posterolateral corner: The posterior lateral corner is unremarkable.\par IMPRESSION:\par Tricompartmental degenerative changes, with cartilage loss most pronounced in the lateral\par femorotibial compartment. M17.11\par Degeneration of the lateral meniscus with associated horizontal tearing. Loose body\par adjacent to the anterior ligament lateral meniscus. M23.41\par Small knee joint effusion. M25.461\par \par

## 2019-06-17 NOTE — HISTORY OF PRESENT ILLNESS
[de-identified] : CC right knee\par Consult Dr Lopez \par \par HPI 48 yo male right HD presents for home note to limit stairs for chronic onset of 3 months of constant pain in the anterior and lateral right knee without injury. The pain is worse, and rated a 8 out of 10, described as "pain ", without radiation. Nothing makes the pain better and walking standing stairs makes the pain worse. The patient reports associated symptoms of swelling. The patient - pain at night affecting sleep, and - similar pain previously.\par \par The patient has tried the following treatments:\par Activity modification	+\par Ice/Compression  	+\par Braces    		-\par Nsaids    		+\par Physical Therapy 	+ no help\par Cortisone Injection	+ no help\par Arthroscopy		-\par \par wants a note so he doesn't have to go down stairs at evening time to take meds, would like to take in room to avoid staiirs due to pain in the knees.\par \par Review of Systems is positive for the above musculoskeletal symptoms and is otherwise non-contributory for general, constitutional, psychiatric, neurologic, HEENT, cardiac, respiratory, gastrointestinal, reproductive, lymphatic, and dermatologic complaints.\par \par

## 2019-08-28 DIAGNOSIS — Z71.89 OTHER SPECIFIED COUNSELING: ICD-10-CM

## 2019-12-20 ENCOUNTER — APPOINTMENT (OUTPATIENT)
Dept: ORTHOPEDIC SURGERY | Facility: CLINIC | Age: 49
End: 2019-12-20

## 2020-01-06 ENCOUNTER — APPOINTMENT (OUTPATIENT)
Dept: ORTHOPEDIC SURGERY | Facility: CLINIC | Age: 50
End: 2020-01-06

## 2020-02-17 ENCOUNTER — EMERGENCY (EMERGENCY)
Facility: HOSPITAL | Age: 50
LOS: 1 days | Discharge: HOME | End: 2020-02-17
Admitting: EMERGENCY MEDICINE
Payer: MEDICAID

## 2020-02-17 VITALS
DIASTOLIC BLOOD PRESSURE: 83 MMHG | TEMPERATURE: 98 F | SYSTOLIC BLOOD PRESSURE: 129 MMHG | OXYGEN SATURATION: 99 % | HEART RATE: 98 BPM | RESPIRATION RATE: 18 BRPM

## 2020-02-17 PROCEDURE — 99283 EMERGENCY DEPT VISIT LOW MDM: CPT

## 2020-02-17 NOTE — ED PROVIDER NOTE - OBJECTIVE STATEMENT
49 year old male history of Anxiety, BPH, Depression, Psychosis, Schizoaffective disorder presents from [facility] ([phone]) for making repetative sounds during therapy.  Patient states that he feels like he needs his medications adjusted, but also states that now that he is at the hospital he feels much better.  Patient states "I want to stay in the hospital and do not want to go back home".  Patient states he has been making his repetitive sounds such as "blah blah blah" for the last few months and denies any new symptoms today.  Patient also states that at the psychotherapy session he was pacing his feet because he felt anxious but feels better now.  Patient denies any SI/HI/AH/VH.  Patient denies illicit drugs or ETOH, no physical complaints or concerns. 49 year old male history of Anxiety, BPH, Depression, Psychosis, Schizoaffective disorder presents from [facility] for making repetitive sounds during therapy.  Patient states that he feels like he needs his medications adjusted, but also states that now that he is at the hospital he feels much better.  Patient states "I want to stay in the hospital and do not want to go back home".  Patient states he has been making his repetitive sounds such as "blah blah blah" for the last few months and denies any new symptoms today.  Patient also states that at the psychotherapy session he was pacing his feet because he felt anxious but feels better now.  Patient denies any SI/HI/AH/VH.  Patient denies illicit drugs or ETOH, no physical complaints or concerns.

## 2020-02-17 NOTE — ED ADULT NURSE NOTE - CHIEF COMPLAINT QUOTE
Patient brought to ER from [facility] by EMS for anxiety.acathesia. Not compliant with meds. Pt lives at [facility]. Pt wants to be seen by . Pt has schizophrenia.

## 2020-02-17 NOTE — ED PROVIDER NOTE - PROGRESS NOTE DETAILS
Patient states he feels fine and is requesting discharge. Collateral obtained by SW indicating no new or concerning symptoms, and recommendation was made for patient to follow-up with outpatient psych for possible medication adjustment.  No indication at this time for further medical work-up or management or for psych consult or admission.  Will discharge back to facility via ambulette.

## 2020-02-17 NOTE — ED PROVIDER NOTE - PATIENT PORTAL LINK FT
You can access the FollowMyHealth Patient Portal offered by [facility] by registering at the following website: [email or web address]. By joining [facility]’s FollowMyHealth portal, you will also be able to view your health information using other applications (apps) compatible with our system.

## 2020-02-17 NOTE — ED PROVIDER NOTE - CLINICAL SUMMARY MEDICAL DECISION MAKING FREE TEXT BOX
49 year old male history of Anxiety, BPH, Depression, Psychosis, Schizoaffective disorder presents from [facility] for making repetitive sounds during therapy and anxiety.  NO AH/VH/SI/HI or other concerning symptoms.  Will have SW obtain collateral, likely discharge.

## 2020-02-17 NOTE — ED PROVIDER NOTE - PMH
Anxiety    BPH (benign prostatic hyperplasia)    Depression    Psychosis    Schizoaffective disorder

## 2020-02-17 NOTE — ED ADULT NURSE REASSESSMENT NOTE - NS ED NURSE REASSESS COMMENT FT1
Evaluated and cleared by NP for discharge. Pt calm denies s/i h/avh presently resources & d/c instructions including transport provided pt verbalizing understanding.

## 2020-02-17 NOTE — ED PROVIDER NOTE - NSFOLLOWUPCLINICS_GEN_ALL_ED_FT
[facility]  Behavioral Health  [address] [place] [address]  Phone: [phone]  Fax:   Follow Up Time:

## 2020-02-17 NOTE — ED ADULT NURSE NOTE - OBJECTIVE STATEMENT
Received pt in  pt bought in by EMS with  c/o receptive movements pt denies si/hi/avh presently safety & comfort measures maintained eval on going.

## 2020-02-17 NOTE — ED BEHAVIORAL HEALTH NOTE - BEHAVIORAL HEALTH NOTE
Worker called [facility] ([phone]) and spoke to Kimberly Brown  for collateral information. All information is as per Ms. Brown:    Ms. Brown states that the patient resides at the [facility] located at [address] [place] [address], [facility]. She states that the patient carries a diagnoses of schizoaffective disorder. She states that the patient was recently hospitalized at [facility] for 2 weeks (January 27 – February 12) for anxiety and hearing voices. She states that the patient was also evaluated in the emergency room at [facility] on 2/15 and discharged. She states at that time the patient was complaining that he was hearing voices. She states that today the patient was pacing back and forth in the office and also was complaining of feeling anxious. She states that the patient has been compliant with medications for the past two days. She denies that the patient stated any SI/HI today. She states that the patient is not using drugs or alcohol. She states that the patient sees a therapist at [facility]. Worker called [facility] ([phone]) and spoke to  Jenny who states that all staff left for the day and she cannot provide clinical information. Case discussed with SUSAN Matson.  Patient’s medication list is in chart.  Per provider, SUSAN Matson, patient is cleared and is able to return to their previous residence, [facility].  has spoken to Ms. Brown,  confirmed that patient’s mode of transportation is [employer] and that patient travels INDEPENDENTLY. Clinical provider is in agreement with [employer] back to alf. Worker called [facility] and arranged for senior ride ([phone]) to transport patient back to [facility]. Invoice #[id number].  eta 7:30pm Worker called [facility] ([phone]) and spoke to Kimberly Evansa  for collateral information. All information is as per Ms. Brown:    Ms. Brown states that the patient resides at the [facility] located at [address] [place] [address], [facility]. She states that the patient carries a diagnoses of schizoaffective disorder. She states that the patient was recently hospitalized at [facility] for 2 weeks (January 27 – February 12) for anxiety and hearing voices. She states that the patient was also evaluated in the emergency room at [facility] on 2/15 and discharged. She states at that time the patient was complaining that he was hearing voices. She states that today the patient was pacing back and forth in the office and also was complaining of feeling anxious. She states that the patient has been compliant with medications for the past two days. She denies that the patient stated any SI/HI today. She states that the patient is not using drugs or alcohol. She states that the patient sees a therapist at [facility]. Worker called [facility] ([phone]) and spoke to  Jenny who states that all staff left for the day and she cannot provide clinical information. Case discussed with SUSAN Matson.  Patient’s medication list is in chart.  Per provider, SUSAN Matson, patient is cleared and is able to return to their previous residence, [facility].  has spoken to Ms. Brown,  confirmed that patient’s mode of transportation is [employer] and that patient travels INDEPENDENTLY. Clinical provider is in agreement with [facility] back to [facility]. Worker called [facility] and arranged for senior ride ([phone]) to transport patient back to [facility]. Invoice #[id number].  eta 7:30pm    Worker then spoke with patient's sister Ct [phone]) who states that recently the patient has been having increased anxiety and cannot sit still. She denies that the patient is having SI/HI. She states that the patient's medication needs adjustment. Worker discussed case with SUSAN Matson. Patient is still pending discharge and will return back to the adult home.

## 2020-02-17 NOTE — ED PROVIDER NOTE - NSFOLLOWUPINSTRUCTIONS_ED_ALL_ED_FT
Please follow-up with outpatient primary care provider and psychiatrist.  [facility] also available (information on this sheet).  Come back to ED for any new or concerning symptoms.

## 2020-02-17 NOTE — ED PROVIDER NOTE - PSYCHIATRIC BEHAVIOR
mild pacing, but able to stop.  Not verbalizing any abnormal words such as "blah, blah, blah" as stated by patient he was doing earlier

## 2020-02-17 NOTE — ED ADULT TRIAGE NOTE - CHIEF COMPLAINT QUOTE
Patient brought to ER from [facility] by EMS for anxiety.acathesia. Not compliant with meds. Pt lives at [facility]. Pt wants to be seen by . Pt has schizophrenia.

## 2020-03-31 PROBLEM — F25.9 SCHIZOAFFECTIVE DISORDER, UNSPECIFIED: Chronic | Status: ACTIVE | Noted: 2020-02-17

## 2020-03-31 PROBLEM — F29 UNSPECIFIED PSYCHOSIS NOT DUE TO A SUBSTANCE OR KNOWN PHYSIOLOGICAL CONDITION: Chronic | Status: ACTIVE | Noted: 2020-02-17

## 2020-03-31 PROBLEM — F41.9 ANXIETY DISORDER, UNSPECIFIED: Chronic | Status: ACTIVE | Noted: 2020-02-17

## 2020-03-31 PROBLEM — F32.9 MAJOR DEPRESSIVE DISORDER, SINGLE EPISODE, UNSPECIFIED: Chronic | Status: ACTIVE | Noted: 2020-02-17

## 2020-03-31 PROBLEM — N40.0 BENIGN PROSTATIC HYPERPLASIA WITHOUT LOWER URINARY TRACT SYMPTOMS: Chronic | Status: ACTIVE | Noted: 2020-02-17

## 2020-06-10 ENCOUNTER — APPOINTMENT (OUTPATIENT)
Dept: UROLOGY | Facility: CLINIC | Age: 50
End: 2020-06-10

## 2021-05-05 ENCOUNTER — APPOINTMENT (OUTPATIENT)
Dept: UROLOGY | Facility: CLINIC | Age: 51
End: 2021-05-05
Payer: MEDICAID

## 2021-05-05 DIAGNOSIS — N48.1 BALANITIS: ICD-10-CM

## 2021-05-05 PROCEDURE — 99214 OFFICE O/P EST MOD 30 MIN: CPT

## 2021-05-05 PROCEDURE — 99072 ADDL SUPL MATRL&STAF TM PHE: CPT

## 2021-05-05 NOTE — HISTORY OF PRESENT ILLNESS
Add rocephin   Follow culture    4d ago    Urine Culture, Routine Abnormal    ENTEROCOCCUS FAECALIS   50,000 - 99,999 cfu/ml     Resulting Agency [facility]   Susceptibility      Enterococcus faecalis     CULTURE, URINE     Ampicillin <=2 mcg/mL Sensitive     Nitrofurantoin <=32 mcg/mL Sensitive     Tetracycline >8 mcg/mL Resistant     Vancomycin 2 mcg/mL Sensitive         Had 2 gms Rocephin on 1/10 and 1GM yesterday   1/15/2020 Day 4 IV rocephin; repeat U/A today   1/16 u/A yesterday normal; day 5/5  rocephin today- completed   Resolved.     [FreeTextEntry1] : cc urgency and uui \par Shun Kalefaiza is a 46 y.o. For f/u evaluation of lower urinary tract symptoms / urinary incontinence. The patient reports frequency, urgency, especially in AMs and after having BM. Improved with ditropan stopped  He c/o constipation, needs manual stimulation to have BM daily.. \par

## 2021-05-06 LAB
APPEARANCE: CLEAR
BACTERIA: NEGATIVE
BILIRUBIN URINE: NEGATIVE
BLOOD URINE: NEGATIVE
COLOR: YELLOW
GLUCOSE QUALITATIVE U: NEGATIVE
HYALINE CASTS: 0 /LPF
KETONES URINE: NEGATIVE
LEUKOCYTE ESTERASE URINE: NEGATIVE
MICROSCOPIC-UA: NORMAL
NITRITE URINE: NEGATIVE
PH URINE: 6
PROTEIN URINE: NEGATIVE
RED BLOOD CELLS URINE: 2 /HPF
SPECIFIC GRAVITY URINE: 1.02
SQUAMOUS EPITHELIAL CELLS: 0 /HPF
UROBILINOGEN URINE: NORMAL
WHITE BLOOD CELLS URINE: 0 /HPF

## 2021-08-27 NOTE — ED PROVIDER NOTE - TEMPLATE, MLM
Writer returned pt's call, speaking with pt directly.  Pt indicated maximum benefit perceived from hospital treatment.  Writer and pt strategized aftercare plans, including resources to be sent to pt and next appointments pt has scheduled.      Pt will be discharged effective 8/27/2021.   Psych/Behavioral

## 2021-10-01 PROCEDURE — G9005: CPT

## 2022-05-11 ENCOUNTER — APPOINTMENT (OUTPATIENT)
Dept: UROLOGY | Facility: CLINIC | Age: 52
End: 2022-05-11
Payer: MEDICAID

## 2022-05-11 PROCEDURE — 99214 OFFICE O/P EST MOD 30 MIN: CPT

## 2022-05-16 LAB — BACTERIA UR CULT: NORMAL

## 2022-05-16 RX ORDER — CLOTRIMAZOLE AND BETAMETHASONE DIPROPIONATE 10; .5 MG/G; MG/G
1-0.05 CREAM TOPICAL TWICE DAILY
Qty: 1 | Refills: 1 | Status: ACTIVE | COMMUNITY
Start: 2022-05-16 | End: 1900-01-01

## 2022-05-17 NOTE — HISTORY OF PRESENT ILLNESS
[FreeTextEntry1] : cc urgency and uui \par Shun Tali is a 51 y.o. For f/u evaluation of lower urinary tract symptoms / urinary incontinence. The patient reports frequency, urgency, especially in AMs and after having BM. Improved with ditropan stopped  He c/o constipation, needs manual stimulation to have BM daily.. \par

## 2022-05-26 ENCOUNTER — APPOINTMENT (OUTPATIENT)
Dept: UROLOGY | Facility: CLINIC | Age: 52
End: 2022-05-26
Payer: MEDICAID

## 2022-05-26 VITALS
WEIGHT: 295 LBS | OXYGEN SATURATION: 99 % | HEART RATE: 80 BPM | TEMPERATURE: 97.8 F | DIASTOLIC BLOOD PRESSURE: 80 MMHG | BODY MASS INDEX: 42.23 KG/M2 | HEIGHT: 70 IN | SYSTOLIC BLOOD PRESSURE: 141 MMHG

## 2022-05-26 PROCEDURE — 52000 CYSTOURETHROSCOPY: CPT

## 2022-06-08 ENCOUNTER — APPOINTMENT (OUTPATIENT)
Dept: ORTHOPEDIC SURGERY | Facility: CLINIC | Age: 52
End: 2022-06-08

## 2022-12-07 ENCOUNTER — APPOINTMENT (OUTPATIENT)
Dept: UROLOGY | Facility: CLINIC | Age: 52
End: 2022-12-07

## 2023-01-20 ENCOUNTER — APPOINTMENT (OUTPATIENT)
Dept: NEUROSURGERY | Facility: CLINIC | Age: 53
End: 2023-01-20
Payer: MEDICAID

## 2023-01-20 ENCOUNTER — RESULT REVIEW (OUTPATIENT)
Age: 53
End: 2023-01-20

## 2023-01-20 VITALS
WEIGHT: 284 LBS | HEART RATE: 87 BPM | OXYGEN SATURATION: 97 % | SYSTOLIC BLOOD PRESSURE: 121 MMHG | BODY MASS INDEX: 40.66 KG/M2 | DIASTOLIC BLOOD PRESSURE: 82 MMHG | HEIGHT: 70 IN | TEMPERATURE: 96.5 F

## 2023-01-20 PROCEDURE — 99205 OFFICE O/P NEW HI 60 MIN: CPT

## 2023-01-20 RX ORDER — DICLOFENAC SODIUM 50 MG/1
50 TABLET, DELAYED RELEASE ORAL
Qty: 60 | Refills: 0 | Status: DISCONTINUED | COMMUNITY
Start: 2018-06-04 | End: 2023-01-20

## 2023-01-20 NOTE — REVIEW OF SYSTEMS
[As Noted in HPI] : as noted in HPI [Numbness] : numbness [Tingling] : tingling [Abnormal Sensation] : an abnormal sensation [Negative] : Heme/Lymph [de-identified] : hx of delusions and hallucinations

## 2023-01-20 NOTE — PHYSICAL EXAM
[General Appearance - Alert] : alert [General Appearance - In No Acute Distress] : in no acute distress [Oriented To Time, Place, And Person] : oriented to person, place, and time [Impaired Insight] : insight and judgment were intact [Affect] : the affect was normal [Mood] : the mood was normal [Person] : oriented to person [Place] : oriented to place [Time] : oriented to time [2+] : Patella left 2+ [Normal] : normal [No Deficits] : no sensory deficits [5] : 5/5 Finger Flexors (C8) [Intact] : all reflexes within normal limits bilaterally [FreeTextEntry1] : Pt reports experiencing grandiose and paranoia delusions, along with homosexual pornographic hallucinations

## 2023-01-20 NOTE — RESULTS/DATA
[FreeTextEntry1] : IMPRESSION:  \par \par 1. There is an elliptically shaped focus of abnormal signal in the spinal cord at the approximate C6 level, posteriorly and just to the right of midline. This has a nonspecific appearance, and differential diagnosis includes myelomalacia, myelitis or possibly even a neoplasm. Additional MRI imaging after intravenous administration of gadolinium may be somewhat useful in further characterizing this focus.\par 2. Apparent atypical hemangioma of bone in the C3 vertebra.\par 3. Small to moderate midline disc herniation at C4-5, causing slight ventral indentation on the spinal cord. The right C5 neural foramen is mildly narrowed.\par 4. Small to moderate left posterolateral degenerative disc/osteophyte complex at C5-6 causing mild spinal stenosis. Both C5-6 foramina are somewhat narrowed, slightly worse on the right (moderate range).\par Thank you for the opportunity to participate in the care of this patient.

## 2023-01-20 NOTE — ASSESSMENT
[FreeTextEntry1] : Mr. Cesar is a 51 y/o, male, with schizophrenia, cervical degeneration with herniations, abnormal cervical spine lesion, and worsening R shoulder pain. Will obtain xray cervical spine to r/o instability and xray R shoulder to r/o OA/fx. MRI cervical spine w/wout contrast needed to r/o spinal neoplasm. Pt to f/u in office with Dr. Cai once studies have been performed.

## 2023-01-20 NOTE — HISTORY OF PRESENT ILLNESS
[FreeTextEntry1] : cervical lesion  [de-identified] : The patient is a 53 y/o, male with a hx of HTN, overactive bladder, schizophrenia with  grandiose/paranoia delusions and sexual hallucinations on risperidone (f/b psych), R knee meniscal tear, here to establish care due to abnormal cervical MRI. The pt reports several years ago he began to experience pain in his R shoulder along with his 4th and 5th digit. During that time MRI of cervical spine was performed at [facility] which showed cervical degenerative changes, herniations, and a an abnormal lesion in spinal cord at level of C6. He reports since then he has not f/u as his brother is involved in his care and is very "controlling" and made him feel paranoid and anxious. Otherwise he has no weakness of UE, LOB, abnormal weight loss, urine incontinence. He currently resides in an adult assisted living.

## 2023-01-25 ENCOUNTER — NON-APPOINTMENT (OUTPATIENT)
Age: 53
End: 2023-01-25

## 2023-01-25 ENCOUNTER — OUTPATIENT (OUTPATIENT)
Dept: OUTPATIENT SERVICES | Facility: HOSPITAL | Age: 53
LOS: 1 days | End: 2023-01-25
Payer: MEDICAID

## 2023-01-25 DIAGNOSIS — M50.20 OTHER CERVICAL DISC DISPLACEMENT, UNSPECIFIED CERVICAL REGION: ICD-10-CM

## 2023-01-25 DIAGNOSIS — M25.511 PAIN IN RIGHT SHOULDER: ICD-10-CM

## 2023-01-25 PROCEDURE — 73030 X-RAY EXAM OF SHOULDER: CPT

## 2023-01-25 PROCEDURE — 73030 X-RAY EXAM OF SHOULDER: CPT | Mod: 26,RT

## 2023-01-25 PROCEDURE — 72052 X-RAY EXAM NECK SPINE 6/>VWS: CPT | Mod: 26

## 2023-01-25 PROCEDURE — 72052 X-RAY EXAM NECK SPINE 6/>VWS: CPT

## 2023-01-26 ENCOUNTER — NON-APPOINTMENT (OUTPATIENT)
Age: 53
End: 2023-01-26

## 2023-02-09 ENCOUNTER — OUTPATIENT (OUTPATIENT)
Dept: OUTPATIENT SERVICES | Facility: HOSPITAL | Age: 53
LOS: 1 days | End: 2023-02-09
Payer: MEDICAID

## 2023-02-09 ENCOUNTER — APPOINTMENT (OUTPATIENT)
Dept: MRI IMAGING | Facility: HOSPITAL | Age: 53
End: 2023-02-09
Payer: MEDICAID

## 2023-02-09 DIAGNOSIS — M54.12 RADICULOPATHY, CERVICAL REGION: ICD-10-CM

## 2023-02-09 DIAGNOSIS — M50.20 OTHER CERVICAL DISC DISPLACEMENT, UNSPECIFIED CERVICAL REGION: ICD-10-CM

## 2023-02-09 PROCEDURE — A9585: CPT

## 2023-02-09 PROCEDURE — 72156 MRI NECK SPINE W/O & W/DYE: CPT | Mod: 26

## 2023-02-09 PROCEDURE — 72156 MRI NECK SPINE W/O & W/DYE: CPT

## 2023-02-13 ENCOUNTER — NON-APPOINTMENT (OUTPATIENT)
Age: 53
End: 2023-02-13

## 2023-03-03 ENCOUNTER — APPOINTMENT (OUTPATIENT)
Dept: UROLOGY | Facility: CLINIC | Age: 53
End: 2023-03-03
Payer: MEDICAID

## 2023-03-03 VITALS
BODY MASS INDEX: 40.66 KG/M2 | HEART RATE: 96 BPM | WEIGHT: 284 LBS | TEMPERATURE: 97.2 F | DIASTOLIC BLOOD PRESSURE: 74 MMHG | OXYGEN SATURATION: 98 % | HEIGHT: 70 IN | SYSTOLIC BLOOD PRESSURE: 129 MMHG

## 2023-03-03 PROCEDURE — 99214 OFFICE O/P EST MOD 30 MIN: CPT

## 2023-03-03 RX ORDER — POLYETHYLENE GLYCOL 3350 17 G/17G
17 POWDER, FOR SOLUTION ORAL DAILY
Qty: 1 | Refills: 0 | Status: ACTIVE | COMMUNITY
Start: 2022-05-11 | End: 1900-01-01

## 2023-03-03 NOTE — HISTORY OF PRESENT ILLNESS
[FreeTextEntry1] : cc urgency and uui \par Shun Cesar is a 51 y.o. For f/u evaluation of lower urinary tract symptoms / urinary incontinence. The patient reports frequency, urgency, especially in AMs and after having BM also uui . Itaking oxybutinin 5 mg  He c/o constipation, needs manual stimulation to have BM daily.. \par still drinking caffeine \par new partner unprotected oral sex

## 2023-03-03 NOTE — ASSESSMENT
[FreeTextEntry1] : minimal pvr\par reduce caffeine \par will increase oxybutinin to 10 mg \par restart tamsulosin\par restart miralax \par check ua cx g/c

## 2023-03-06 LAB
APPEARANCE: CLEAR
BACTERIA UR CULT: NORMAL
BACTERIA: NEGATIVE
BILIRUBIN URINE: NEGATIVE
BLOOD URINE: NEGATIVE
C TRACH RRNA SPEC QL NAA+PROBE: NOT DETECTED
COLOR: NORMAL
GLUCOSE QUALITATIVE U: NEGATIVE
HYALINE CASTS: 0 /LPF
KETONES URINE: NEGATIVE
LEUKOCYTE ESTERASE URINE: NEGATIVE
MICROSCOPIC-UA: NORMAL
N GONORRHOEA RRNA SPEC QL NAA+PROBE: NOT DETECTED
NITRITE URINE: NEGATIVE
PH URINE: 6
PROTEIN URINE: NEGATIVE
RED BLOOD CELLS URINE: 1 /HPF
SOURCE AMPLIFICATION: NORMAL
SPECIFIC GRAVITY URINE: 1.01
SQUAMOUS EPITHELIAL CELLS: 0 /HPF
UROBILINOGEN URINE: NORMAL
WHITE BLOOD CELLS URINE: 0 /HPF

## 2023-03-22 RX ORDER — OXYBUTYNIN CHLORIDE 10 MG/1
10 TABLET, EXTENDED RELEASE ORAL DAILY
Qty: 90 | Refills: 0 | Status: DISCONTINUED | COMMUNITY
Start: 2018-01-03 | End: 2023-03-22

## 2023-03-28 ENCOUNTER — APPOINTMENT (OUTPATIENT)
Dept: NEUROSURGERY | Facility: CLINIC | Age: 53
End: 2023-03-28
Payer: MEDICAID

## 2023-03-28 VITALS
SYSTOLIC BLOOD PRESSURE: 132 MMHG | TEMPERATURE: 98 F | OXYGEN SATURATION: 97 % | HEIGHT: 70 IN | BODY MASS INDEX: 40.66 KG/M2 | WEIGHT: 284 LBS | HEART RATE: 80 BPM | DIASTOLIC BLOOD PRESSURE: 84 MMHG

## 2023-03-28 DIAGNOSIS — M50.20 OTHER CERVICAL DISC DISPLACEMENT, UNSPECIFIED CERVICAL REGION: ICD-10-CM

## 2023-03-28 DIAGNOSIS — N88.9 NONINFLAMMATORY DISORDER OF CERVIX UTERI, UNSPECIFIED: ICD-10-CM

## 2023-03-28 DIAGNOSIS — M54.12 RADICULOPATHY, CERVICAL REGION: ICD-10-CM

## 2023-03-28 PROCEDURE — 99213 OFFICE O/P EST LOW 20 MIN: CPT

## 2023-04-05 NOTE — HISTORY OF PRESENT ILLNESS
[FreeTextEntry1] : cervical lesion  [de-identified] : 03/28/23: Patient is a 51 y/o, male, here for f/u to review recent MRI and xrays. He reports his most bothersome symptom is his R shoulder pain. He continues to have neck pain and in his fingers on R. He is not currently taking any medications for his pain as it interferes with his psychiatric issues. Denies urine incontinence, abnormal weight loss, significant weakness. \par \par 01/20/23: The patient is a 51 y/o, male with a hx of HTN, overactive bladder, schizophrenia with  grandiose/paranoia delusions and sexual hallucinations on risperidone (f/b psych), R knee meniscal tear, here to establish care due to abnormal cervical MRI. The pt reports several years ago he began to experience pain in his R shoulder along with his 4th and 5th digit. During that time MRI of cervical spine was performed at [facility] which showed cervical degenerative changes, herniations, and a an abnormal lesion in spinal cord at level of C6. He reports since then he has not f/u as his brother is involved in his care and is very "controlling" and made him feel paranoid and anxious. Otherwise he has no weakness of UE, LOB, abnormal weight loss, urine incontinence. He currently resides in an adult assisted living.

## 2023-04-05 NOTE — ASSESSMENT
[FreeTextEntry1] : Mr. Cesar is a 53 y/o, male, with schizophrenia, cervical degeneration with herniations, abnormal cervical spine lesion. R shoulder xray with no significant abnormality advised pt to see ortho for further evaluation. Advised conservative management, referral for PT provided.

## 2023-04-05 NOTE — RESULTS/DATA
[FreeTextEntry1] : IMPRESSION:\par 1. No high-grade cervical spinal canal or neural canal stenosis.\par 2. 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 Nonspecific small nonenhancing focus of hyperintense signal abnormality within the right hemicord at C4-C5 and questionable additional focus within the left hemicord at C5-C6, may be artifactual. Differential includes chronic demyelinating plaques in the proper clinical setting. Consider follow-up brain MRI if there is clinical concern for demyelinating disease.

## 2023-04-05 NOTE — REVIEW OF SYSTEMS
[As Noted in HPI] : as noted in HPI [Numbness] : numbness [Tingling] : tingling [Abnormal Sensation] : an abnormal sensation [Negative] : Heme/Lymph [de-identified] : hx of delusions and hallucinations

## 2023-04-19 ENCOUNTER — APPOINTMENT (OUTPATIENT)
Dept: UROLOGY | Facility: CLINIC | Age: 53
End: 2023-04-19
Payer: MEDICAID

## 2023-04-19 PROCEDURE — 99213 OFFICE O/P EST LOW 20 MIN: CPT

## 2023-04-20 ENCOUNTER — NON-APPOINTMENT (OUTPATIENT)
Age: 53
End: 2023-04-20

## 2023-05-04 NOTE — HISTORY OF PRESENT ILLNESS
[FreeTextEntry1] : cc urgency and uui \par Shun Cesar is a 51 y.o. For f/u evaluation of lower urinary tract symptoms / urinary incontinence. The patient reports frequency, urgency, especially in AMs and after having BM also uui . Itaking oxybutinin 5 mg  He c/o constipation, needs manual stimulation to have BM daily.. \par still drinking caffeine \par new partner unprotected oral sex \par oxybutinin increrased to 10 mg but thinks its causing gout

## 2023-05-09 ENCOUNTER — APPOINTMENT (OUTPATIENT)
Dept: NEUROSURGERY | Facility: CLINIC | Age: 53
End: 2023-05-09
Payer: MEDICAID

## 2023-05-09 VITALS
SYSTOLIC BLOOD PRESSURE: 123 MMHG | DIASTOLIC BLOOD PRESSURE: 80 MMHG | HEART RATE: 79 BPM | WEIGHT: 280 LBS | HEIGHT: 70 IN | TEMPERATURE: 98.5 F | OXYGEN SATURATION: 98 % | BODY MASS INDEX: 40.09 KG/M2

## 2023-05-09 DIAGNOSIS — G89.29 PAIN IN RIGHT SHOULDER: ICD-10-CM

## 2023-05-09 DIAGNOSIS — M25.511 PAIN IN RIGHT SHOULDER: ICD-10-CM

## 2023-05-09 DIAGNOSIS — M54.2 CERVICALGIA: ICD-10-CM

## 2023-05-09 PROCEDURE — 99213 OFFICE O/P EST LOW 20 MIN: CPT

## 2023-05-09 NOTE — HISTORY OF PRESENT ILLNESS
[FreeTextEntry1] : cervical lesion  [de-identified] : 5/9/23\par He is here for review of the xrays. There has been no change in his symptoms. He continues to have pain in the right shoulder are.\par  \par 03/28/23: Patient is a 53 y/o, male, here for f/u to review recent MRI and xrays. He reports his most bothersome symptom is his R shoulder pain. He continues to have neck pain and in his fingers on R. He is not currently taking any medications for his pain as it interferes with his psychiatric issues. Denies urine incontinence, abnormal weight loss, significant weakness. \par \par 01/20/23: The patient is a 53 y/o, male with a hx of HTN, overactive bladder, schizophrenia with  grandiose/paranoia delusions and sexual hallucinations on risperidone (f/b psych), R knee meniscal tear, here to establish care due to abnormal cervical MRI. The pt reports several years ago he began to experience pain in his R shoulder along with his 4th and 5th digit. During that time MRI of cervical spine was performed at [facility] which showed cervical degenerative changes, herniations, and a an abnormal lesion in spinal cord at level of C6. He reports since then he has not f/u as his brother is involved in his care and is very "controlling" and made him feel paranoid and anxious. Otherwise he has no weakness of UE, LOB, abnormal weight loss, urine incontinence. He currently resides in an adult assisted living.

## 2023-05-09 NOTE — ASSESSMENT
[FreeTextEntry1] : At this time I do not think he needs surgery for the cervical spine. He should continue with PT and his orthopedic surgery for the shoulder pain.

## 2023-05-09 NOTE — REVIEW OF SYSTEMS
[As Noted in HPI] : as noted in HPI [Numbness] : numbness [Tingling] : tingling [Abnormal Sensation] : an abnormal sensation [Negative] : Heme/Lymph [de-identified] : hx of delusions and hallucinations

## 2023-05-09 NOTE — REVIEW OF SYSTEMS
[As Noted in HPI] : as noted in HPI [Numbness] : numbness [Tingling] : tingling [Abnormal Sensation] : an abnormal sensation [Negative] : Heme/Lymph [de-identified] : hx of delusions and hallucinations

## 2023-05-09 NOTE — HISTORY OF PRESENT ILLNESS
[FreeTextEntry1] : cervical lesion  [de-identified] : 5/9/23\par He is here for review of the xrays. There has been no change in his symptoms. He continues to have pain in the right shoulder are.\par  \par 03/28/23: Patient is a 51 y/o, male, here for f/u to review recent MRI and xrays. He reports his most bothersome symptom is his R shoulder pain. He continues to have neck pain and in his fingers on R. He is not currently taking any medications for his pain as it interferes with his psychiatric issues. Denies urine incontinence, abnormal weight loss, significant weakness. \par \par 01/20/23: The patient is a 51 y/o, male with a hx of HTN, overactive bladder, schizophrenia with  grandiose/paranoia delusions and sexual hallucinations on risperidone (f/b psych), R knee meniscal tear, here to establish care due to abnormal cervical MRI. The pt reports several years ago he began to experience pain in his R shoulder along with his 4th and 5th digit. During that time MRI of cervical spine was performed at [facility] which showed cervical degenerative changes, herniations, and a an abnormal lesion in spinal cord at level of C6. He reports since then he has not f/u as his brother is involved in his care and is very "controlling" and made him feel paranoid and anxious. Otherwise he has no weakness of UE, LOB, abnormal weight loss, urine incontinence. He currently resides in an adult assisted living.

## 2023-09-06 ENCOUNTER — APPOINTMENT (OUTPATIENT)
Dept: UROLOGY | Facility: CLINIC | Age: 53
End: 2023-09-06
Payer: MEDICAID

## 2023-09-06 VITALS
DIASTOLIC BLOOD PRESSURE: 83 MMHG | OXYGEN SATURATION: 95 % | HEART RATE: 77 BPM | HEIGHT: 70 IN | RESPIRATION RATE: 16 BRPM | BODY MASS INDEX: 40.09 KG/M2 | WEIGHT: 280 LBS | TEMPERATURE: 97.7 F | SYSTOLIC BLOOD PRESSURE: 124 MMHG

## 2023-09-06 PROCEDURE — 99213 OFFICE O/P EST LOW 20 MIN: CPT

## 2023-09-10 NOTE — HISTORY OF PRESENT ILLNESS
[FreeTextEntry1] : cc urgency and uui  Shun Cesar is a 53 y.o. For f/u evaluation of lower urinary tract symptoms / urinary incontinence. The patient reports frequency, urgency, especially in AMs and after having BM also uui . Itaking oxybutinin 5 mg  He c/o constipation, needs manual stimulation to have BM daily..  still drinking caffeine  new partner unprotected oral sex  oxybutinin increrased to 10 mg but thinks its causing gout   addendum now reFUSING TO TAKE oxybutinin or tamsulosin  started truvada for prep

## 2023-09-10 NOTE — PHYSICAL EXAM
[General Appearance - Well Developed] : well developed [General Appearance - Well Nourished] : well nourished [Normal Appearance] : normal appearance [Well Groomed] : well groomed [General Appearance - In No Acute Distress] : no acute distress [Abdomen Soft] : soft [Abdomen Tenderness] : non-tender [Costovertebral Angle Tenderness] : no ~M costovertebral angle tenderness [Urethral Meatus] : meatus normal [Urinary Bladder Findings] : the bladder was normal on palpation [Scrotum] : the scrotum was normal [Testes Mass (___cm)] : there were no testicular masses [No Prostate Nodules] : no prostate nodules [Edema] : no peripheral edema [] : no respiratory distress [Respiration, Rhythm And Depth] : normal respiratory rhythm and effort [Exaggerated Use Of Accessory Muscles For Inspiration] : no accessory muscle use

## 2023-09-15 LAB
APPEARANCE: CLEAR
BACTERIA UR CULT: NORMAL
BACTERIA: NEGATIVE /HPF
BILIRUBIN URINE: NEGATIVE
BLOOD URINE: NEGATIVE
C TRACH RRNA SPEC QL NAA+PROBE: NOT DETECTED
CAST: 0 /LPF
COLOR: YELLOW
EPITHELIAL CELLS: 0 /HPF
GLUCOSE QUALITATIVE U: NEGATIVE MG/DL
KETONES URINE: NEGATIVE MG/DL
LEUKOCYTE ESTERASE URINE: NEGATIVE
MICROSCOPIC-UA: NORMAL
N GONORRHOEA RRNA SPEC QL NAA+PROBE: NOT DETECTED
NITRITE URINE: NEGATIVE
PH URINE: 5.5
PROTEIN URINE: NEGATIVE MG/DL
RED BLOOD CELLS URINE: 1 /HPF
SOURCE AMPLIFICATION: NORMAL
SPECIFIC GRAVITY URINE: 1.01
UROBILINOGEN URINE: 0.2 MG/DL
WHITE BLOOD CELLS URINE: 0 /HPF

## 2023-10-19 ENCOUNTER — APPOINTMENT (OUTPATIENT)
Dept: GASTROENTEROLOGY | Facility: CLINIC | Age: 53
End: 2023-10-19
Payer: MEDICAID

## 2023-10-19 VITALS
SYSTOLIC BLOOD PRESSURE: 120 MMHG | DIASTOLIC BLOOD PRESSURE: 80 MMHG | HEART RATE: 87 BPM | HEIGHT: 69 IN | WEIGHT: 275 LBS | BODY MASS INDEX: 40.73 KG/M2 | OXYGEN SATURATION: 98 %

## 2023-10-19 DIAGNOSIS — J45.20 MILD INTERMITTENT ASTHMA, UNCOMPLICATED: ICD-10-CM

## 2023-10-19 DIAGNOSIS — K40.90 UNILATERAL INGUINAL HERNIA, W/OUT OBSTRUCTION OR GANGRENE, NOT SPECIFIED AS RECURRENT: ICD-10-CM

## 2023-10-19 DIAGNOSIS — Z86.79 PERSONAL HISTORY OF OTHER DISEASES OF THE CIRCULATORY SYSTEM: ICD-10-CM

## 2023-10-19 PROCEDURE — 99204 OFFICE O/P NEW MOD 45 MIN: CPT

## 2023-10-19 RX ORDER — FLUOXETINE HYDROCHLORIDE 10 MG/1
10 CAPSULE ORAL
Refills: 0 | Status: ACTIVE | COMMUNITY

## 2023-10-19 RX ORDER — PALIPERIDONE PALMITATE 410 MG/1.315ML
410 INJECTION, SUSPENSION, EXTENDED RELEASE INTRAMUSCULAR
Refills: 0 | Status: ACTIVE | COMMUNITY

## 2023-11-20 ENCOUNTER — APPOINTMENT (OUTPATIENT)
Dept: GASTROENTEROLOGY | Facility: CLINIC | Age: 53
End: 2023-11-20
Payer: MEDICAID

## 2023-11-20 VITALS
DIASTOLIC BLOOD PRESSURE: 78 MMHG | TEMPERATURE: 97.8 F | HEIGHT: 69 IN | HEART RATE: 91 BPM | BODY MASS INDEX: 41.47 KG/M2 | RESPIRATION RATE: 16 BRPM | WEIGHT: 280 LBS | SYSTOLIC BLOOD PRESSURE: 120 MMHG | OXYGEN SATURATION: 98 %

## 2023-11-20 DIAGNOSIS — R10.2 PELVIC AND PERINEAL PAIN: ICD-10-CM

## 2023-11-20 PROCEDURE — 99213 OFFICE O/P EST LOW 20 MIN: CPT

## 2023-11-24 ENCOUNTER — APPOINTMENT (OUTPATIENT)
Dept: ULTRASOUND IMAGING | Facility: CLINIC | Age: 53
End: 2023-11-24
Payer: MEDICAID

## 2023-11-24 PROCEDURE — 76700 US EXAM ABDOM COMPLETE: CPT

## 2023-12-02 NOTE — ASSESSMENT
[FreeTextEntry1] : At this time I do not think he needs surgery for the cervical spine. He should continue with PT and his orthopedic surgery for the shoulder pain. Mr. Valdes is a 64 yo male with PMHx of history of HTN, DM, CAD, psoriasis, NSCL adenocarcinoma (diagnosed in 2021) with 4 metastatic enhancing lesions with surrounding edema in the brain, on Sotarasib (s/p carbo/alimta, s/p multiple rounds of SRS, recent WBRT on 11/30/23) presenting with GCT seizure. Head imaging with vasogenic edema, progressed from previous imaging. Admitted for further workup.

## 2023-12-20 ENCOUNTER — APPOINTMENT (OUTPATIENT)
Dept: GASTROENTEROLOGY | Facility: CLINIC | Age: 53
End: 2023-12-20

## 2023-12-20 VITALS
TEMPERATURE: 98.7 F | BODY MASS INDEX: 41.47 KG/M2 | OXYGEN SATURATION: 97 % | HEART RATE: 108 BPM | RESPIRATION RATE: 16 BRPM | WEIGHT: 280 LBS | HEIGHT: 69 IN | DIASTOLIC BLOOD PRESSURE: 68 MMHG | SYSTOLIC BLOOD PRESSURE: 90 MMHG

## 2023-12-26 NOTE — ED ADULT NURSE NOTE - NS ED PATIENT SAFETY CONCERN
[facility]/Hospital: [facility]   [address] [address] [phone]    Psychiatric Progress Note  MRN#: [id number]  Leena Arechiga 77 y.o. female    This note was not shared with the patient due to reasonable likelihood of causing patient harm   ___________________________________________________________________________________________________________________________________  OFFICE APPOINTMENT   Seen today at [facility] location                                                Patient Leena Arechiga ,1946   Prescriber/Physician: Kym Howard DO Physician Location:   [facility] OUTPATIENT  [address] [address]       Leena stopped Duloxetine, Debby since noticed Leena is tired a lot , with negative talk about herself if forgetful , also high anxiety if outside normal routine; worrying about others , then Leena tremors - Debby question compliance to meds.    Leena Joaquín  Debby Guardado is primary care taker - does dinners , lunchs, drives   Leena  No

## 2024-01-26 ENCOUNTER — APPOINTMENT (OUTPATIENT)
Dept: GASTROENTEROLOGY | Facility: CLINIC | Age: 54
End: 2024-01-26
Payer: MEDICAID

## 2024-01-26 VITALS
TEMPERATURE: 97.6 F | DIASTOLIC BLOOD PRESSURE: 90 MMHG | WEIGHT: 280 LBS | HEART RATE: 87 BPM | SYSTOLIC BLOOD PRESSURE: 162 MMHG | RESPIRATION RATE: 16 BRPM | OXYGEN SATURATION: 97 % | BODY MASS INDEX: 41.47 KG/M2 | HEIGHT: 69 IN

## 2024-01-26 DIAGNOSIS — K21.9 GASTRO-ESOPHAGEAL REFLUX DISEASE W/OUT ESOPHAGITIS: ICD-10-CM

## 2024-01-26 PROCEDURE — 99214 OFFICE O/P EST MOD 30 MIN: CPT

## 2024-01-28 PROBLEM — K21.9 GERD (GASTROESOPHAGEAL REFLUX DISEASE): Status: ACTIVE | Noted: 2024-01-28

## 2024-01-28 NOTE — ASSESSMENT
[FreeTextEntry1] : I ordered a CT Scan of his abdomen and pelvis.  ALVARO CHRISTENSEN was advised to undergo endoscopy to which he agreed. The procedure will be performed in [facility] with the assistance of an anesthesiologist. He was given a booklet distributed by the American Society of Gastrointestinal Endoscopy explaining the procedure in detail and he understood the risks of the procedure not limited to infection, bleeding, perforation or non- diagnosis of gastric or esophageal cancer.  He was advised that he could not drive home, if he chooses to receive sedation. Further diagnostic and treatment recommendations will be based upon the procedure and any biopsies, if they are taken. Thank you for allowing me to participate in this [facility] health care.   I spent 33 minutes  with the patient ands answered all of his questions.

## 2024-01-28 NOTE — HISTORY OF PRESENT ILLNESS
[FreeTextEntry1] : He still complains of intermittent RLQ  abdominal pain.  He feels he has a hernia. He had a colonoscopy 3 years ago ago which was negative.  I had ordered a CT Scan on his last visit which was denied as he first required an ultrasound. Since then, he has had an ultrasound which was negative. for gallstones, but did reveal a fatty liver. He also complains of heartburn.

## 2024-02-19 ENCOUNTER — APPOINTMENT (OUTPATIENT)
Dept: CT IMAGING | Facility: IMAGING CENTER | Age: 54
End: 2024-02-19

## 2024-03-12 ENCOUNTER — RESULT REVIEW (OUTPATIENT)
Age: 54
End: 2024-03-12

## 2024-03-12 ENCOUNTER — APPOINTMENT (OUTPATIENT)
Dept: GASTROENTEROLOGY | Facility: AMBULATORY SURGERY CENTER | Age: 54
End: 2024-03-12
Payer: MEDICAID

## 2024-03-12 PROCEDURE — 43239 EGD BIOPSY SINGLE/MULTIPLE: CPT

## 2024-03-12 RX ORDER — PANTOPRAZOLE 40 MG/1
40 TABLET, DELAYED RELEASE ORAL
Qty: 30 | Refills: 4 | Status: ACTIVE | COMMUNITY
Start: 2024-03-12 | End: 1900-01-01

## 2024-03-21 ENCOUNTER — APPOINTMENT (OUTPATIENT)
Dept: UROLOGY | Facility: CLINIC | Age: 54
End: 2024-03-21
Payer: MEDICAID

## 2024-03-21 VITALS
BODY MASS INDEX: 41.47 KG/M2 | DIASTOLIC BLOOD PRESSURE: 60 MMHG | HEIGHT: 69 IN | SYSTOLIC BLOOD PRESSURE: 113 MMHG | TEMPERATURE: 97.9 F | HEART RATE: 59 BPM | OXYGEN SATURATION: 98 % | WEIGHT: 280 LBS

## 2024-03-21 DIAGNOSIS — N52.9 MALE ERECTILE DYSFUNCTION, UNSPECIFIED: ICD-10-CM

## 2024-03-21 DIAGNOSIS — N32.81 OVERACTIVE BLADDER: ICD-10-CM

## 2024-03-21 PROCEDURE — 99214 OFFICE O/P EST MOD 30 MIN: CPT

## 2024-03-21 RX ORDER — OXYBUTYNIN CHLORIDE 5 MG/1
5 TABLET, EXTENDED RELEASE ORAL
Qty: 90 | Refills: 1 | Status: ACTIVE | COMMUNITY
Start: 2018-04-04 | End: 1900-01-01

## 2024-03-21 RX ORDER — SILDENAFIL 50 MG/1
50 TABLET ORAL
Qty: 6 | Refills: 3 | Status: ACTIVE | COMMUNITY
Start: 2024-03-21 | End: 1900-01-01

## 2024-03-21 NOTE — END OF VISIT
[FreeTextEntry4] : This note was written by Eli Reed on 03/21/2024 actively solely Roman Tanner M.D. I, Eli Reed, am scribing for and in the presence of Roman Tanner M.D. in the following sections HISTORY OF PRESENT ILLNESS, PAST MEDICAL/FAMILY/SOCIAL HISTORY; REVIEW OF SYSTEMS; VITAL SIGNS; PHYSICAL EXAM; ASSESSMENT/PLAN.   All medical record entries made by this scribe at my, Roman Tanner M.D. direction and personally dictated by me on 03/21/2024. I personally performed the services described in the documentation, reviewed the documentation recorded by the scribe in my presence, and it accurately and completely records my words and actions.

## 2024-03-21 NOTE — HISTORY OF PRESENT ILLNESS
[FreeTextEntry1] : 09/06/2023 cc urgency and uui Shun Cesar is a 53 y.o. For f/u evaluation of lower urinary tract symptoms / urinary incontinence. The patient reports frequency, urgency, especially in AMs and after having BM also uui . Itaking oxybutinin 5 mg He c/o constipation, needs manual stimulation to have BM daily.. still drinking caffeine new partner unprotected oral sex oxybutinin increrased to 10 mg but thinks its causing gout  addendum now reFUSING TO TAKE oxybutinin or tamsulosin started truvada for prep  03/21/2024 cc slow urine stream and poor erections  53 year old male that presents with c/o slow urine stream and poor erections. He is taking Oxybutynin and Tamsulosin, and states he no longer has any accidents. Pt reports his erections are poor and he can't masturbate. He states he is having unprotected oral intercourse with a partner.  Pt reports he has been getting psychiatric injection that makes him restless. He states he believes his injections are "toxic" to him and reports being constantly afraid on medication. He states he no longer wants injections and will ask his doctor to switch to a pill instead. Pt believes injections are causing his poor erections.   Pt was in the hospital in [facility] in 06/2023 due to anxiety about hernia. He also reports being told he is Schizophrenic.

## 2024-03-21 NOTE — ASSESSMENT
[FreeTextEntry1] : 53 year old male with slow urine stream and poor erections.   Rx for Sildenafil given today. Rx refill for Oxybutynin given today. Rx refill for Tamsulosin given today.

## 2024-03-25 LAB
ANION GAP SERPL CALC-SCNC: 12 MMOL/L
APPEARANCE: CLEAR
BACTERIA: NEGATIVE /HPF
BILIRUBIN URINE: NEGATIVE
BLOOD URINE: NEGATIVE
BUN SERPL-MCNC: 10 MG/DL
C TRACH RRNA SPEC QL NAA+PROBE: NOT DETECTED
CALCIUM SERPL-MCNC: 9.5 MG/DL
CAST: 0 /LPF
CHLORIDE SERPL-SCNC: 103 MMOL/L
CO2 SERPL-SCNC: 25 MMOL/L
COLOR: YELLOW
CREAT SERPL-MCNC: 0.84 MG/DL
EGFR: 104 ML/MIN/1.73M2
EPITHELIAL CELLS: 0 /HPF
GLUCOSE QUALITATIVE U: NEGATIVE MG/DL
GLUCOSE SERPL-MCNC: 95 MG/DL
HIV1+2 AB SPEC QL IA.RAPID: NONREACTIVE
HSV 1+2 IGG SER IA-IMP: NEGATIVE
HSV 1+2 IGG SER IA-IMP: POSITIVE
HSV1 IGG SER QL: 56.2 INDEX
HSV2 IGG SER QL: 0.07 INDEX
KETONES URINE: NEGATIVE MG/DL
LEUKOCYTE ESTERASE URINE: NEGATIVE
MICROSCOPIC-UA: NORMAL
N GONORRHOEA RRNA SPEC QL NAA+PROBE: NOT DETECTED
NITRITE URINE: NEGATIVE
PH URINE: 7
POTASSIUM SERPL-SCNC: 4 MMOL/L
PROTEIN URINE: NEGATIVE MG/DL
PSA SERPL-MCNC: 0.33 NG/ML
RED BLOOD CELLS URINE: 2 /HPF
SODIUM SERPL-SCNC: 140 MMOL/L
SOURCE AMPLIFICATION: NORMAL
SPECIFIC GRAVITY URINE: 1.02
T PALLIDUM AB SER QL IA: NEGATIVE
UROBILINOGEN URINE: 1 MG/DL
WHITE BLOOD CELLS URINE: 0 /HPF

## 2024-04-12 ENCOUNTER — APPOINTMENT (OUTPATIENT)
Dept: GASTROENTEROLOGY | Facility: CLINIC | Age: 54
End: 2024-04-12

## 2024-04-16 ENCOUNTER — APPOINTMENT (OUTPATIENT)
Dept: GASTROENTEROLOGY | Facility: CLINIC | Age: 54
End: 2024-04-16
Payer: MEDICAID

## 2024-04-16 VITALS
RESPIRATION RATE: 14 BRPM | DIASTOLIC BLOOD PRESSURE: 86 MMHG | HEART RATE: 60 BPM | BODY MASS INDEX: 37.47 KG/M2 | SYSTOLIC BLOOD PRESSURE: 130 MMHG | HEIGHT: 69 IN | TEMPERATURE: 98 F | WEIGHT: 253 LBS | OXYGEN SATURATION: 99 %

## 2024-04-16 DIAGNOSIS — K86.2 CYST OF PANCREAS: ICD-10-CM

## 2024-04-16 PROCEDURE — 99214 OFFICE O/P EST MOD 30 MIN: CPT | Mod: 25

## 2024-04-16 PROCEDURE — 36415 COLL VENOUS BLD VENIPUNCTURE: CPT

## 2024-04-16 RX ORDER — LINACLOTIDE 145 UG/1
145 CAPSULE, GELATIN COATED ORAL
Qty: 30 | Refills: 3 | Status: ACTIVE | COMMUNITY
Start: 2024-04-16 | End: 1900-01-01

## 2024-04-16 RX ORDER — DICYCLOMINE HYDROCHLORIDE 20 MG/1
20 TABLET ORAL
Qty: 90 | Refills: 2 | Status: ACTIVE | COMMUNITY
Start: 2024-04-16 | End: 1900-01-01

## 2024-04-16 NOTE — HISTORY OF PRESENT ILLNESS
[FreeTextEntry1] : He still complains of constipation and lower abdominal pain.  He has tried over-the-counter Colace and Dulcolax which have been ineffective.  He had a CAT scan of his abdomen which did not reveal a hernia but did reveal a pancreatic nodule for which they recommended an MRI.  He also complains of intermittent crampy diffuse abdominal pain

## 2024-04-16 NOTE — ASSESSMENT
[FreeTextEntry1] : Labs were drawn  I ordered an MRI of his pancreas I empirically placed him on Linzess 145 mg daily and dicyclomine 20 mg TID  ALVARO CHRISTENSEN was advised to undergo colonoscopy to which he agreed. The procedure will be performed in [facility] with the assistance of an anesthesiologist. The patient was given a Suprep preparation prescription and understood the  procedure as it was explained to his. He was given a booklet distributed by the American Society of Gastrointestinal  Endoscopy explaining the procedure in detail and he understood the risks of the procedure not limited to infection, bleeding, perforation or non- diagnosis of colorectal cancer. He was advised that he could not drive home, if he chooses to  receive sedation.  Further diagnostic and treatment recommendations will be based upon the procedure and any biopsies, if they are taken.  Thank you for allowing me to participate in this [facility] health care.  , Best personal regards -- Don

## 2024-04-17 LAB
ALBUMIN SERPL ELPH-MCNC: 4.8 G/DL
ALP BLD-CCNC: 85 U/L
ALT SERPL-CCNC: 29 U/L
AMYLASE/CREAT SERPL: 52 U/L
ANION GAP SERPL CALC-SCNC: 15 MMOL/L
AST SERPL-CCNC: 20 U/L
BASOPHILS # BLD AUTO: 0.01 K/UL
BASOPHILS NFR BLD AUTO: 0.2 %
BILIRUB SERPL-MCNC: 0.4 MG/DL
BUN SERPL-MCNC: 6 MG/DL
CALCIUM SERPL-MCNC: 9.6 MG/DL
CANCER AG19-9 SERPL-ACNC: 9 U/ML
CHLORIDE SERPL-SCNC: 105 MMOL/L
CO2 SERPL-SCNC: 22 MMOL/L
CREAT SERPL-MCNC: 0.78 MG/DL
EGFR: 107 ML/MIN/1.73M2
EOSINOPHIL # BLD AUTO: 0.13 K/UL
EOSINOPHIL NFR BLD AUTO: 2.6 %
GLUCOSE SERPL-MCNC: 137 MG/DL
HCT VFR BLD CALC: 42 %
HGB BLD-MCNC: 14.2 G/DL
IMM GRANULOCYTES NFR BLD AUTO: 0.2 %
LPL SERPL-CCNC: 63 U/L
LYMPHOCYTES # BLD AUTO: 1.39 K/UL
LYMPHOCYTES NFR BLD AUTO: 27.6 %
MAN DIFF?: NORMAL
MCHC RBC-ENTMCNC: 29 PG
MCHC RBC-ENTMCNC: 33.8 GM/DL
MCV RBC AUTO: 85.9 FL
MONOCYTES # BLD AUTO: 0.32 K/UL
MONOCYTES NFR BLD AUTO: 6.4 %
NEUTROPHILS # BLD AUTO: 3.17 K/UL
NEUTROPHILS NFR BLD AUTO: 63 %
PLATELET # BLD AUTO: 209 K/UL
POTASSIUM SERPL-SCNC: 3.8 MMOL/L
PROT SERPL-MCNC: 7.4 G/DL
RBC # BLD: 4.89 M/UL
RBC # FLD: 13.9 %
SODIUM SERPL-SCNC: 142 MMOL/L
WBC # FLD AUTO: 5.03 K/UL

## 2024-05-10 ENCOUNTER — APPOINTMENT (OUTPATIENT)
Dept: MRI IMAGING | Facility: CLINIC | Age: 54
End: 2024-05-10
Payer: MEDICAID

## 2024-05-10 PROCEDURE — 74183 MRI ABD W/O CNTR FLWD CNTR: CPT

## 2024-05-10 PROCEDURE — A9585: CPT

## 2024-05-20 RX ORDER — DICYCLOMINE HYDROCHLORIDE 10 MG/1
10 CAPSULE ORAL
Qty: 90 | Refills: 3 | Status: ACTIVE | COMMUNITY
Start: 2024-05-20 | End: 1900-01-01

## 2024-06-03 ENCOUNTER — APPOINTMENT (OUTPATIENT)
Dept: GASTROENTEROLOGY | Facility: CLINIC | Age: 54
End: 2024-06-03
Payer: MEDICAID

## 2024-06-03 VITALS
OXYGEN SATURATION: 98 % | SYSTOLIC BLOOD PRESSURE: 110 MMHG | TEMPERATURE: 97.7 F | DIASTOLIC BLOOD PRESSURE: 76 MMHG | HEIGHT: 69 IN | WEIGHT: 253 LBS | BODY MASS INDEX: 37.47 KG/M2 | HEART RATE: 87 BPM | RESPIRATION RATE: 14 BRPM

## 2024-06-03 DIAGNOSIS — R10.31 RIGHT LOWER QUADRANT PAIN: ICD-10-CM

## 2024-06-03 DIAGNOSIS — R10.32 LEFT LOWER QUADRANT PAIN: ICD-10-CM

## 2024-06-03 DIAGNOSIS — K59.00 CONSTIPATION, UNSPECIFIED: ICD-10-CM

## 2024-06-03 DIAGNOSIS — R10.84 GENERALIZED ABDOMINAL PAIN: ICD-10-CM

## 2024-06-03 DIAGNOSIS — F20.9 SCHIZOPHRENIA, UNSPECIFIED: ICD-10-CM

## 2024-06-03 PROCEDURE — 99213 OFFICE O/P EST LOW 20 MIN: CPT

## 2024-06-05 PROBLEM — K59.00 CONSTIPATION: Status: ACTIVE | Noted: 2022-05-11

## 2024-06-05 PROBLEM — R10.32 INTERMITTENT LEFT LOWER QUADRANT ABDOMINAL PAIN: Status: ACTIVE | Noted: 2024-01-26

## 2024-06-05 PROBLEM — R10.31 INTERMITTENT RIGHT LOWER QUADRANT ABDOMINAL PAIN: Status: ACTIVE | Noted: 2023-11-20

## 2024-06-05 PROBLEM — R10.84 ABDOMINAL PAIN, DIFFUSE: Status: ACTIVE | Noted: 2024-04-16

## 2024-06-05 PROBLEM — F20.9 SCHIZOPHRENIA: Status: ACTIVE | Noted: 2023-01-20

## 2024-06-05 NOTE — ASSESSMENT
[FreeTextEntry1] : The patient is scheduled for a  colonoscopy next month   I spent 24 minutes with the patient and answered all of his questions

## 2024-06-05 NOTE — HISTORY OF PRESENT ILLNESS
[FreeTextEntry1] : His recent CAT scan revealed an enlarged liver with fat deposition and an exophytic nodule near his pancreas.  An MRI was recommended.  An MRI  was completed which did not reveal any pancreatic pathology.  I placed him on dicyclomine 20 mg 3 times a day which was helping him but he cannot take it because it was affecting his mental status and making him dizzy, so he stopped.  He is feeling well now and has no complaints.  He is scheduled for a colonoscopy next month.

## 2024-06-07 ENCOUNTER — NON-APPOINTMENT (OUTPATIENT)
Age: 54
End: 2024-06-07

## 2024-06-07 RX ORDER — SODIUM SULFATE, POTASSIUM SULFATE AND MAGNESIUM SULFATE 1.6; 3.13; 17.5 G/177ML; G/177ML; G/177ML
17.5-3.13-1.6 SOLUTION ORAL TWICE DAILY
Qty: 2 | Refills: 0 | Status: ACTIVE | COMMUNITY
Start: 2024-04-16 | End: 1900-01-01

## 2024-06-19 RX ORDER — TAMSULOSIN HYDROCHLORIDE 0.4 MG/1
0.4 CAPSULE ORAL
Qty: 90 | Refills: 0 | Status: ACTIVE | COMMUNITY
Start: 2020-03-31 | End: 1900-01-01

## 2024-07-10 ENCOUNTER — APPOINTMENT (OUTPATIENT)
Dept: GASTROENTEROLOGY | Facility: AMBULATORY SURGERY CENTER | Age: 54
End: 2024-07-10

## 2024-09-26 ENCOUNTER — APPOINTMENT (OUTPATIENT)
Dept: UROLOGY | Facility: CLINIC | Age: 54
End: 2024-09-26
Payer: MEDICAID

## 2024-09-26 VITALS
DIASTOLIC BLOOD PRESSURE: 78 MMHG | HEIGHT: 69 IN | HEART RATE: 74 BPM | RESPIRATION RATE: 16 BRPM | BODY MASS INDEX: 37.47 KG/M2 | SYSTOLIC BLOOD PRESSURE: 121 MMHG | WEIGHT: 253 LBS | OXYGEN SATURATION: 98 % | TEMPERATURE: 98.2 F

## 2024-09-26 DIAGNOSIS — N52.9 MALE ERECTILE DYSFUNCTION, UNSPECIFIED: ICD-10-CM

## 2024-09-26 DIAGNOSIS — N48.1 BALANITIS: ICD-10-CM

## 2024-09-26 DIAGNOSIS — N32.81 OVERACTIVE BLADDER: ICD-10-CM

## 2024-09-26 PROCEDURE — 99214 OFFICE O/P EST MOD 30 MIN: CPT

## 2024-09-26 PROCEDURE — G2211 COMPLEX E/M VISIT ADD ON: CPT | Mod: NC

## 2024-09-27 ENCOUNTER — APPOINTMENT (OUTPATIENT)
Dept: MRI IMAGING | Facility: CLINIC | Age: 54
End: 2024-09-27

## 2024-09-29 NOTE — END OF VISIT
[FreeTextEntry4] : This note was written by Javy Chowdhury on 09/26/2024 actively solely Roman Tanner M.D. I, Javy Chowdhury, am scribing for and in the presence of Roman Tanner M.D. in the following sections HISTORY OF PRESENT ILLNESS, PAST MEDICAL/FAMILY/SOCIAL HISTORY; REVIEW OF SYSTEMS; VITAL SIGNS; PHYSICAL EXAM; ASSESSMENT/PLAN. All medical record entries made by this scribe at , Roman Tanner M.D. direction and personally dictated by me on 09/26/2024. I personally performed the services described in the documentation, reviewed the documentation recorded by the scribe in my presence, and it accurately and completely records my words and actions.

## 2024-09-29 NOTE — HISTORY OF PRESENT ILLNESS
[FreeTextEntry1] : 09/06/2023 cc urgency and uui Shun Cesar is a 53 y.o. For f/u evaluation of lower urinary tract symptoms / urinary incontinence. The patient reports frequency, urgency, especially in AMs and after having BM also uui . Itaking oxybutinin 5 mg He c/o constipation, needs manual stimulation to have BM daily.. still drinking caffeine new partner unprotected oral sex oxybutinin increrased to 10 mg but thinks its causing gout  addendum now reFUSING TO TAKE oxybutinin or tamsulosin started truvada for prep  03/21/2024 cc slow urine stream and poor erections  53 year old male that presents with c/o slow urine stream and poor erections. He is taking Oxybutynin and Tamsulosin, and states he no longer has any accidents. Pt reports his erections are poor and he can't masturbate. He states he is having unprotected oral intercourse with a partner.  Pt reports he has been getting psychiatric injection that makes him restless. He states he believes his injections are "toxic" to him and reports being constantly afraid on medication. He states he no longer wants injections and will ask his doctor to switch to a pill instead. Pt believes injections are causing his poor erections.  Pt was in the hospital in [facility] in 06/2023 due to anxiety about hernia. He also reports being told he is Schizophrenic.  09/26/2024 cc: frequency and ED  Pt is a 54 year old male presenting for a follow up visit with c/o frequency and ED. Pt reports that most of his past urinary symptoms are still the same, but he has no inflammation on his penis. Pt still takes flomax but stopped taking oxybutynin because he ran out. Pt is afraid to take ed meds for fear of vision symptoms. Pt is only orally sexually active with at least two partners.

## 2024-09-29 NOTE — HISTORY OF PRESENT ILLNESS
[FreeTextEntry1] : 09/06/2023 cc urgency and uui Shun Cesar is a 53 y.o. For f/u evaluation of lower urinary tract symptoms / urinary incontinence. The patient reports frequency, urgency, especially in AMs and after having BM also uui . Itaking oxybutinin 5 mg He c/o constipation, needs manual stimulation to have BM daily.. still drinking caffeine new partner unprotected oral sex oxybutinin increrased to 10 mg but thinks its causing gout  addendum now reFUSING TO TAKE oxybutinin or tamsulosin started truvada for prep  03/21/2024 cc slow urine stream and poor erections  53 year old male that presents with c/o slow urine stream and poor erections. He is taking Oxybutynin and Tamsulosin, and states he no longer has any accidents. Pt reports his erections are poor and he can't masturbate. He states he is having unprotected oral intercourse with a partner.  Pt reports he has been getting psychiatric injection that makes him restless. He states he believes his injections are "toxic" to him and reports being constantly afraid on medication. He states he no longer wants injections and will ask his doctor to switch to a pill instead. Pt believes injections are causing his poor erections.  Pt was in the hospital in [facility] in 06/2023 due to anxiety about hernia. He also reports being told he is Schizophrenic.  09/26/2024 cc: frequency and ED  Pt is a 54 year old male presenting for a follow up visit with c/o frequency and ED. Pt reports that most of his past urinary symptoms are still the same, but he has no inflammation on his penis. Pt still takes flomax but stopped taking oxybutynin because he ran out. Pt is afraid to take ed meds for fear of vision symptoms. Pt is only orally sexually active with at least two partners.

## 2024-09-29 NOTE — ASSESSMENT
[FreeTextEntry1] : Pt is a 54 year old male with frequency and ED.   Discussed how sexual intercourse can affect urinary system.  Discussed likelihood of the side effects associated with ED meds   UA and culture done today STD testing done today Rx for oxybutynin  Rx for Tamsulosin    Patient is being seen today for evaluation and management of a chronic and longitudinal ongoing condition and I am of the primary treating physician.

## 2024-09-30 LAB
APPEARANCE: CLEAR
BACTERIA UR CULT: NORMAL
BACTERIA: NEGATIVE /HPF
BILIRUBIN URINE: NEGATIVE
BLOOD URINE: NEGATIVE
C TRACH RRNA SPEC QL NAA+PROBE: NOT DETECTED
CAST: 0 /LPF
COLOR: YELLOW
EPITHELIAL CELLS: 0 /HPF
GLUCOSE QUALITATIVE U: NEGATIVE MG/DL
HIV1+2 AB SPEC QL IA.RAPID: NONREACTIVE
HSV 1+2 IGG SER IA-IMP: NEGATIVE
HSV 1+2 IGG SER IA-IMP: POSITIVE
HSV1 IGG SER QL: 46.4 INDEX
HSV2 IGG SER QL: 0.04 INDEX
KETONES URINE: NEGATIVE MG/DL
LEUKOCYTE ESTERASE URINE: NEGATIVE
MICROSCOPIC-UA: NORMAL
N GONORRHOEA RRNA SPEC QL NAA+PROBE: NOT DETECTED
NITRITE URINE: NEGATIVE
PH URINE: 7
PROTEIN URINE: NEGATIVE MG/DL
RED BLOOD CELLS URINE: 0 /HPF
SOURCE AMPLIFICATION: NORMAL
SPECIFIC GRAVITY URINE: 1.01
T PALLIDUM AB SER QL IA: NEGATIVE
UROBILINOGEN URINE: 0.2 MG/DL
WHITE BLOOD CELLS URINE: 0 /HPF

## 2024-11-07 ENCOUNTER — APPOINTMENT (OUTPATIENT)
Dept: GASTROENTEROLOGY | Facility: AMBULATORY SURGERY CENTER | Age: 54
End: 2024-11-07
Payer: MEDICAID

## 2024-11-07 DIAGNOSIS — Z12.11 ENCOUNTER FOR SCREENING FOR MALIGNANT NEOPLASM OF COLON: ICD-10-CM

## 2024-11-07 PROCEDURE — 45378 DIAGNOSTIC COLONOSCOPY: CPT

## 2024-11-07 RX ORDER — LINACLOTIDE 145 UG/1
145 CAPSULE, GELATIN COATED ORAL
Qty: 30 | Refills: 5 | Status: ACTIVE | COMMUNITY
Start: 2024-11-07 | End: 1900-01-01

## 2024-11-20 ENCOUNTER — APPOINTMENT (OUTPATIENT)
Dept: UROLOGY | Facility: CLINIC | Age: 54
End: 2024-11-20
Payer: MEDICAID

## 2024-11-20 VITALS
OXYGEN SATURATION: 98 % | SYSTOLIC BLOOD PRESSURE: 131 MMHG | HEART RATE: 84 BPM | WEIGHT: 242 LBS | TEMPERATURE: 96.8 F | DIASTOLIC BLOOD PRESSURE: 74 MMHG | BODY MASS INDEX: 35.74 KG/M2

## 2024-11-20 DIAGNOSIS — N32.81 OVERACTIVE BLADDER: ICD-10-CM

## 2024-11-20 PROCEDURE — G2211 COMPLEX E/M VISIT ADD ON: CPT | Mod: NC

## 2024-11-20 PROCEDURE — 99213 OFFICE O/P EST LOW 20 MIN: CPT

## 2024-11-25 LAB
APPEARANCE: CLEAR
BACTERIA UR CULT: NORMAL
BACTERIA: NEGATIVE /HPF
BILIRUBIN URINE: NEGATIVE
BLOOD URINE: NEGATIVE
C TRACH RRNA SPEC QL NAA+PROBE: NOT DETECTED
CAST: 0 /LPF
COLOR: YELLOW
EPITHELIAL CELLS: 0 /HPF
GLUCOSE QUALITATIVE U: NEGATIVE MG/DL
KETONES URINE: NEGATIVE MG/DL
LEUKOCYTE ESTERASE URINE: NEGATIVE
MICROSCOPIC-UA: NORMAL
N GONORRHOEA RRNA SPEC QL NAA+PROBE: NOT DETECTED
NITRITE URINE: NEGATIVE
PH URINE: 6.5
PROTEIN URINE: NEGATIVE MG/DL
RED BLOOD CELLS URINE: 1 /HPF
SOURCE AMPLIFICATION: NORMAL
SPECIFIC GRAVITY URINE: 1.01
UROBILINOGEN URINE: 0.2 MG/DL
WHITE BLOOD CELLS URINE: 0 /HPF

## 2024-12-10 ENCOUNTER — APPOINTMENT (OUTPATIENT)
Dept: GASTROENTEROLOGY | Facility: CLINIC | Age: 54
End: 2024-12-10

## 2024-12-21 NOTE — ED ADULT NURSE NOTE - EXTENSIONS OF SELF_ADULT
Mother called and yesterday reached out for antibiotic prescription for eye conjunctivitis no response according to mom son has awakened at night and slight cough as well.  Will go ahead and in light of nighttime awakening question pulling at ear prescribe amoxicillin along with 4 pinkeye.... Amoxicillin at t 90 mg/kg for presumed otitis media with..  Tobramycin eyedrops   None

## 2025-01-10 ENCOUNTER — APPOINTMENT (OUTPATIENT)
Dept: GASTROENTEROLOGY | Facility: CLINIC | Age: 55
End: 2025-01-10
Payer: MEDICAID

## 2025-01-10 VITALS
OXYGEN SATURATION: 96 % | TEMPERATURE: 98 F | BODY MASS INDEX: 36.88 KG/M2 | RESPIRATION RATE: 14 BRPM | SYSTOLIC BLOOD PRESSURE: 114 MMHG | DIASTOLIC BLOOD PRESSURE: 78 MMHG | WEIGHT: 249 LBS | HEART RATE: 70 BPM | HEIGHT: 69 IN

## 2025-01-10 DIAGNOSIS — Z12.11 ENCOUNTER FOR SCREENING FOR MALIGNANT NEOPLASM OF COLON: ICD-10-CM

## 2025-01-10 DIAGNOSIS — R10.32 LEFT LOWER QUADRANT PAIN: ICD-10-CM

## 2025-01-10 DIAGNOSIS — K59.00 CONSTIPATION, UNSPECIFIED: ICD-10-CM

## 2025-01-10 PROCEDURE — 99213 OFFICE O/P EST LOW 20 MIN: CPT

## 2025-03-26 ENCOUNTER — OUTPATIENT (OUTPATIENT)
Dept: OUTPATIENT SERVICES | Facility: HOSPITAL | Age: 55
LOS: 1 days | End: 2025-03-26
Payer: MEDICAID

## 2025-03-26 VITALS
OXYGEN SATURATION: 97 % | WEIGHT: 240.08 LBS | RESPIRATION RATE: 18 BRPM | TEMPERATURE: 98 F | SYSTOLIC BLOOD PRESSURE: 115 MMHG | HEART RATE: 75 BPM | DIASTOLIC BLOOD PRESSURE: 70 MMHG | HEIGHT: 70 IN

## 2025-03-26 DIAGNOSIS — M17.11 UNILATERAL PRIMARY OSTEOARTHRITIS, RIGHT KNEE: ICD-10-CM

## 2025-03-26 DIAGNOSIS — F25.9 SCHIZOAFFECTIVE DISORDER, UNSPECIFIED: ICD-10-CM

## 2025-03-26 DIAGNOSIS — Z01.818 ENCOUNTER FOR OTHER PREPROCEDURAL EXAMINATION: ICD-10-CM

## 2025-03-26 DIAGNOSIS — N40.0 BENIGN PROSTATIC HYPERPLASIA WITHOUT LOWER URINARY TRACT SYMPTOMS: ICD-10-CM

## 2025-03-26 LAB
ALBUMIN SERPL ELPH-MCNC: 4.1 G/DL — SIGNIFICANT CHANGE UP (ref 3.5–5)
ALP SERPL-CCNC: 76 U/L — SIGNIFICANT CHANGE UP (ref 40–120)
ALT FLD-CCNC: 27 U/L DA — SIGNIFICANT CHANGE UP (ref 10–60)
ANION GAP SERPL CALC-SCNC: 7 MMOL/L — SIGNIFICANT CHANGE UP (ref 5–17)
AST SERPL-CCNC: 18 U/L — SIGNIFICANT CHANGE UP (ref 10–40)
BILIRUB SERPL-MCNC: 0.6 MG/DL — SIGNIFICANT CHANGE UP (ref 0.2–1.2)
BLD GP AB SCN SERPL QL: SIGNIFICANT CHANGE UP
BUN SERPL-MCNC: 7 MG/DL — SIGNIFICANT CHANGE UP (ref 7–18)
CALCIUM SERPL-MCNC: 9.1 MG/DL — SIGNIFICANT CHANGE UP (ref 8.4–10.5)
CHLORIDE SERPL-SCNC: 108 MMOL/L — SIGNIFICANT CHANGE UP (ref 96–108)
CO2 SERPL-SCNC: 25 MMOL/L — SIGNIFICANT CHANGE UP (ref 22–31)
CREAT SERPL-MCNC: 0.77 MG/DL — SIGNIFICANT CHANGE UP (ref 0.5–1.3)
EGFR: 106 ML/MIN/1.73M2 — SIGNIFICANT CHANGE UP
EGFR: 106 ML/MIN/1.73M2 — SIGNIFICANT CHANGE UP
GLUCOSE SERPL-MCNC: 121 MG/DL — HIGH (ref 70–99)
HCT VFR BLD CALC: 42.2 % — SIGNIFICANT CHANGE UP (ref 39–50)
HGB BLD-MCNC: 14.3 G/DL — SIGNIFICANT CHANGE UP (ref 13–17)
INR BLD: 1.12 RATIO — SIGNIFICANT CHANGE UP (ref 0.85–1.16)
MCHC RBC-ENTMCNC: 28.7 PG — SIGNIFICANT CHANGE UP (ref 27–34)
MCHC RBC-ENTMCNC: 33.9 G/DL — SIGNIFICANT CHANGE UP (ref 32–36)
MCV RBC AUTO: 84.7 FL — SIGNIFICANT CHANGE UP (ref 80–100)
NRBC BLD AUTO-RTO: 0 /100 WBCS — SIGNIFICANT CHANGE UP (ref 0–0)
PLATELET # BLD AUTO: 193 K/UL — SIGNIFICANT CHANGE UP (ref 150–400)
POTASSIUM SERPL-MCNC: 3.7 MMOL/L — SIGNIFICANT CHANGE UP (ref 3.5–5.3)
POTASSIUM SERPL-SCNC: 3.7 MMOL/L — SIGNIFICANT CHANGE UP (ref 3.5–5.3)
PROT SERPL-MCNC: 7.6 G/DL — SIGNIFICANT CHANGE UP (ref 6–8.3)
PROTHROM AB SERPL-ACNC: 13.1 SEC — SIGNIFICANT CHANGE UP (ref 9.9–13.4)
RBC # BLD: 4.98 M/UL — SIGNIFICANT CHANGE UP (ref 4.2–5.8)
RBC # FLD: 13.4 % — SIGNIFICANT CHANGE UP (ref 10.3–14.5)
SODIUM SERPL-SCNC: 140 MMOL/L — SIGNIFICANT CHANGE UP (ref 135–145)
WBC # BLD: 5.01 K/UL — SIGNIFICANT CHANGE UP (ref 3.8–10.5)
WBC # FLD AUTO: 5.01 K/UL — SIGNIFICANT CHANGE UP (ref 3.8–10.5)

## 2025-03-26 RX ORDER — TAMSULOSIN HYDROCHLORIDE 0.4 MG/1
1 CAPSULE ORAL
Refills: 0 | DISCHARGE

## 2025-03-26 RX ORDER — SENNA 187 MG
2 TABLET ORAL
Refills: 0 | DISCHARGE

## 2025-03-26 RX ORDER — OXYBUTYNIN CHLORIDE 5 MG/1
1 TABLET, FILM COATED, EXTENDED RELEASE ORAL
Refills: 0 | DISCHARGE

## 2025-03-26 RX ORDER — PROPRANOLOL HCL 60 MG
1 TABLET ORAL
Refills: 0 | DISCHARGE

## 2025-03-26 RX ORDER — OLANZAPINE 10 MG/1
1 TABLET ORAL
Refills: 0 | DISCHARGE

## 2025-03-26 RX ORDER — OMEPRAZOLE 20 MG/1
1 CAPSULE, DELAYED RELEASE ORAL
Refills: 0 | DISCHARGE

## 2025-03-26 RX ORDER — VALBENAZINE 80 MG/1
1 CAPSULE ORAL
Refills: 0 | DISCHARGE

## 2025-03-26 NOTE — H&P PST ADULT - NSICDXPASTMEDICALHX_GEN_ALL_CORE_FT
PAST MEDICAL HISTORY:  Anxiety     BPH (benign prostatic hyperplasia)     Depression     Psychosis     Schizoaffective disorder

## 2025-03-26 NOTE — H&P PST ADULT - ASSESSMENT
X     STOP BANG -     CAPRINI SCORE     AGE RELATED RISK FACTORS                                                             [ ] Age 41-60 years                                            (1 Point)  [ ] Age: 61-74 years                                           (2 Points)                 [ ] Age= 75 years                                                (3 Points)             DISEASE RELATED RISK FACTORS                                                       [ ] Edema in the lower extremities                 (1 Point)                     [ ] Varicose veins                                               (1 Point)                                 [ ] BMI > 25 Kg/m2                                            (1 Point)                                  [ ] Serious infection (ie PNA)                            (1 Point)                     [ ] Lung disease ( COPD, Emphysema)            (1 Point)                                                                          [ ] Acute myocardial infarction                         (1 Point)                  [ ] Congestive heart failure (in the previous month)  (1 Point)         [ ] Inflammatory bowel disease                            (1 Point)                  [ ] Central venous access, PICC or Port               (2 points)       (within the last month)                                                                [ ] Stroke (in the previous month)                        (5 Points)    [ ] Previous or present malignancy                       (2 points)                                                                                                                                                         HEMATOLOGY RELATED FACTORS                                                         [ ] Prior episodes of VTE                                     (3 Points)                     [ ] Positive family history for VTE                      (3 Points)                  [ ] Prothrombin 77653 A                                     (3 Points)                     [ ] Factor V Leiden                                                (3 Points)                        [ ] Lupus anticoagulants                                      (3 Points)                                                           [ ] Anticardiolipin antibodies                              (3 Points)                                                       [ ] High homocysteine in the blood                   (3 Points)                                             [ ] Other congenital or acquired thrombophilia      (3 Points)                                                [ ] Heparin induced thrombocytopenia                  (3 Points)                                        MOBILITY RELATED FACTORS  [ ] Bed rest                                                         (1 Point)  [ ] Plaster cast                                                    (2 points)  [ ] Bed bound for more than 72 hours           (2 Points)    GENDER SPECIFIC FACTORS  [ ] Pregnancy or had a baby within the last month   (1 Point)  [ ] Post-partum < 6 weeks                                   (1 Point)  [ ] Hormonal therapy  or oral contraception   (1 Point)  [ ] History of pregnancy complications              (1 point)  [ ] Unexplained or recurrent              (1 Point)    OTHER RISK FACTORS                                           (1 Point)  [ ] BMI >40, smoking, diabetes requiring insulin, chemotherapy  blood transfusions and length of surgery over 2 hours    SURGERY RELATED RISK FACTORS  [ ]  Section within the last month     (1 Point)  [ ] Minor surgery                                                  (1 Point)  [ ] Arthroscopic surgery                                       (2 Points)  [ ] Planned major surgery lasting more            (2 Points)      than 45 minutes     [ ] Elective hip or knee joint replacement       (5 points)       surgery                                                TRAUMA RELATED RISK FACTORS  [ ] Fracture of the hip, pelvis, or leg                       (5 Points)  [ ] Spinal cord injury resulting in paralysis             (5 points)       (in the previous month)    [ ] Paralysis  (less than 1 month)                             (5 Points)  [ ] Multiple Trauma within 1 month                        (5 Points)    Total Score [       ]    Caprini Score 0-2: Low Risk, mechanical prophylaxis (ie:compression devices)  Caprini Score 3-6: Moderate Risk , pharmacologic VTE prophylaxis is indicated for most patients (in the absence of contraindications)  Caprini Score Greater than or =7: High risk, pharmocologic VTE prophylaxis indicated for most patients (in the absence of contraindications)   55 y/o male c/o progressively worsening right knee pain due to Osteoarthritis, now scheduled for Right Total Knee Replacement on 25 with Dr. Galarza.     EDINSON BANG - 5     CAPRINI SCORE     AGE RELATED RISK FACTORS                                                             [x ] Age 41-60 years                                            (1 Point)  [ ] Age: 61-74 years                                           (2 Points)                 [ ] Age= 75 years                                                (3 Points)             DISEASE RELATED RISK FACTORS                                                       [ ] Edema in the lower extremities                 (1 Point)                     [ ] Varicose veins                                               (1 Point)                                 [x ] BMI > 25 Kg/m2                                            (1 Point)                                  [ ] Serious infection (ie PNA)                            (1 Point)                     [ ] Lung disease ( COPD, Emphysema)            (1 Point)                                                                          [ ] Acute myocardial infarction                         (1 Point)                  [ ] Congestive heart failure (in the previous month)  (1 Point)         [ ] Inflammatory bowel disease                            (1 Point)                  [ ] Central venous access, PICC or Port               (2 points)       (within the last month)                                                                [ ] Stroke (in the previous month)                        (5 Points)    [ ] Previous or present malignancy                       (2 points)                                                                                                                                                         HEMATOLOGY RELATED FACTORS                                                         [ ] Prior episodes of VTE                                     (3 Points)                     [ ] Positive family history for VTE                      (3 Points)                  [ ] Prothrombin 08491 A                                     (3 Points)                     [ ] Factor V Leiden                                                (3 Points)                        [ ] Lupus anticoagulants                                      (3 Points)                                                           [ ] Anticardiolipin antibodies                              (3 Points)                                                       [ ] High homocysteine in the blood                   (3 Points)                                             [ ] Other congenital or acquired thrombophilia      (3 Points)                                                [ ] Heparin induced thrombocytopenia                  (3 Points)                                        MOBILITY RELATED FACTORS  [ ] Bed rest                                                         (1 Point)  [ ] Plaster cast                                                    (2 points)  [ ] Bed bound for more than 72 hours           (2 Points)    GENDER SPECIFIC FACTORS  [ ] Pregnancy or had a baby within the last month   (1 Point)  [ ] Post-partum < 6 weeks                                   (1 Point)  [ ] Hormonal therapy  or oral contraception   (1 Point)  [ ] History of pregnancy complications              (1 point)  [ ] Unexplained or recurrent              (1 Point)    OTHER RISK FACTORS                                           (1 Point)  [ ] BMI >40, smoking, diabetes requiring insulin, chemotherapy  blood transfusions and length of surgery over 2 hours    SURGERY RELATED RISK FACTORS  [ ]  Section within the last month     (1 Point)  [ ] Minor surgery                                                  (1 Point)  [ ] Arthroscopic surgery                                       (2 Points)  [ ] Planned major surgery lasting more            (2 Points)      than 45 minutes     [x ] Elective hip or knee joint replacement       (5 points)       surgery                                                TRAUMA RELATED RISK FACTORS  [ ] Fracture of the hip, pelvis, or leg                       (5 Points)  [ ] Spinal cord injury resulting in paralysis             (5 points)       (in the previous month)    [ ] Paralysis  (less than 1 month)                             (5 Points)  [ ] Multiple Trauma within 1 month                        (5 Points)    Total Score [     7  ]    Caprini Score 0-2: Low Risk, mechanical prophylaxis (ie:compression devices)  Caprini Score 3-6: Moderate Risk , pharmacologic VTE prophylaxis is indicated for most patients (in the absence of contraindications)  Caprini Score Greater than or =7: High risk, pharmocologic VTE prophylaxis indicated for most patients (in the absence of contraindications)

## 2025-03-26 NOTE — H&P PST ADULT - HISTORY OF PRESENT ILLNESS
53 y/o male, from [facility], with PMH of Asthma, Schizophrenia, Osteoarthritis, BPH, Urinary Incontinence.   No PSH.    Pt c/o progressively worsening right knee pain for the past 7 years.    Pt presents to pre-surgical evaluation now scheduled for    with Dr. Shagufta Vaca:   53 y/o male, from [facility], with PMH of Asthma, Schizophrenia, Osteoarthritis, BPH, Urinary Incontinence.   No PSH.  Pt c/o progressively worsening right knee pain for the past 7 years.  Pt presents to pre-surgical evaluation now scheduled for Right Total Knee Replacement on 4/1/25 with Dr. Galarza.

## 2025-03-27 LAB
HCV AB S/CO SERPL IA: 0.11 S/CO — SIGNIFICANT CHANGE UP (ref 0–0.79)
HCV AB SERPL-IMP: SIGNIFICANT CHANGE UP
MRSA PCR RESULT.: SIGNIFICANT CHANGE UP
S AUREUS DNA NOSE QL NAA+PROBE: DETECTED

## 2025-03-27 PROCEDURE — 86803 HEPATITIS C AB TEST: CPT

## 2025-03-27 PROCEDURE — 86850 RBC ANTIBODY SCREEN: CPT

## 2025-03-27 PROCEDURE — G0463: CPT

## 2025-03-27 PROCEDURE — 80053 COMPREHEN METABOLIC PANEL: CPT

## 2025-03-27 PROCEDURE — 85610 PROTHROMBIN TIME: CPT

## 2025-03-27 PROCEDURE — 36415 COLL VENOUS BLD VENIPUNCTURE: CPT

## 2025-03-27 PROCEDURE — 87641 MR-STAPH DNA AMP PROBE: CPT

## 2025-03-27 PROCEDURE — 93005 ELECTROCARDIOGRAM TRACING: CPT

## 2025-03-27 PROCEDURE — 87640 STAPH A DNA AMP PROBE: CPT

## 2025-03-27 PROCEDURE — 85027 COMPLETE CBC AUTOMATED: CPT

## 2025-03-27 PROCEDURE — 86901 BLOOD TYPING SEROLOGIC RH(D): CPT

## 2025-03-27 PROCEDURE — 86900 BLOOD TYPING SEROLOGIC ABO: CPT

## 2025-03-27 RX ORDER — MUPIROCIN CALCIUM 20 MG/G
1 CREAM TOPICAL
Qty: 1 | Refills: 0
Start: 2025-03-27 | End: 2025-03-31

## 2025-03-27 RX ORDER — POVIDONE-IODINE 7.5 %
1 SOLUTION, NON-ORAL TOPICAL ONCE
Refills: 0 | Status: COMPLETED | OUTPATIENT
Start: 2025-05-13 | End: 2025-05-13

## 2025-03-27 NOTE — CHART NOTE - NSCHARTNOTEFT_GEN_A_CORE
PRE-TJR SOCIAL/FUNCTIONAL SCREENING Via:     In Person Meet  on 3/26/2025:  Preferred Language:  English  Patient Telephone #:  [phone]  EMAIL ADDRESS: [email or web address]  Insurance:  HIP  Pt stated Goal for Surgery : Squat  SURGERY DETAILS:  Sx Date:  4/1/2025                                                                                           Surgery Type:  Right Knee Replacement  Surgeon:  Dr. Michell DAVIS Tool: 11/12 Directly home  Attitude towards SDD:  Fair, nervous about going home same day    SOCIAL HISTORY:  [facility]    Stairs Required to Access (Street to Front Door) Residence:   None      Once Inside Pt Residence:   To Access a Bathroom:      No Stairs Required      To Access a Bedroom Where Pt. Can Sleep:  No Stairs Required     Pt. Currently Has Formal Home Health Services:  Yes ; Lives in an assisted living facility     MOBILITY HISTORY:   Baseline Ambulation- Pt is Independent in ambulation without assistive device:    Pt. Owns Any Other Medical Equipment?   Yes; Pt has rolling walker     MEDICAL HISTORY:  Pt has any History of Back Surgery:   No     Pt has any Allergies:    Yes; Certain Psych meds have negative affects on the patient  Patient denies diagnosis of sleep apnea or use of CPAP    Pt. Pharmacy Information:  Name:  [facility]                  Address:  [address] [place] [address]        Telephone #: [phone]    Pt. will Require Transportation to Home Upon Discharge: No, patient has transport from the longterm    For Post-Acute Care:  Pt. prefers [facility] for post-acute needs however he states that he has PT in his longterm  Pt electronically sent pre- op education book "What to do before and after knee replacement".  All details of upcoming procedure discussed including, precautions, the throughput process, post-op process including transportation, home-care and follow-up as well as important information regarding blood clots, pneumonia, falls, infection and pain.  Patient provided with stretching strap for HEP.    All questions answered and pt. verbalized understanding.  Pt. has my phone number for follow up as needed.

## 2025-04-30 ENCOUNTER — OUTPATIENT (OUTPATIENT)
Dept: OUTPATIENT SERVICES | Facility: HOSPITAL | Age: 55
LOS: 1 days | End: 2025-04-30
Payer: MEDICAID

## 2025-04-30 VITALS
HEIGHT: 70 IN | RESPIRATION RATE: 16 BRPM | WEIGHT: 240.08 LBS | TEMPERATURE: 98 F | SYSTOLIC BLOOD PRESSURE: 118 MMHG | HEART RATE: 65 BPM | DIASTOLIC BLOOD PRESSURE: 77 MMHG | OXYGEN SATURATION: 98 %

## 2025-04-30 DIAGNOSIS — M17.11 UNILATERAL PRIMARY OSTEOARTHRITIS, RIGHT KNEE: ICD-10-CM

## 2025-04-30 DIAGNOSIS — Z01.818 ENCOUNTER FOR OTHER PREPROCEDURAL EXAMINATION: ICD-10-CM

## 2025-04-30 LAB
MRSA PCR RESULT.: SIGNIFICANT CHANGE UP
S AUREUS DNA NOSE QL NAA+PROBE: DETECTED

## 2025-04-30 NOTE — H&P PST ADULT - ASSESSMENT
54 y.o male with PMHx for Anxiety, BPH, Depression, Psychosis, Schizoaffective disorder . Now with c/o worsening right knee pain. on w/u found to have unilateral primary osteoarthritis right knee and now for schedule Right Total Knee Replacement on 25 with Dr Michell VILLEGAS 3  CAPRINI SCORE 7    AGE RELATED RISK FACTORS                                                             [x] Age 41-60 years                                            (1 Point)  [ ] Age: 61-74 years                                           (2 Points)                 [ ] Age= 75 years                                                (3 Points)             DISEASE RELATED RISK FACTORS                                                       [ ] Edema in the lower extremities                 (1 Point)                     [ ] Varicose veins                                               (1 Point)                                 [ x] BMI > 25 Kg/m2                                            (1 Point)                                  [ ] Serious infection (ie PNA)                            (1 Point)                     [ ] Lung disease ( COPD, Emphysema)            (1 Point)                                                                          [ ] Acute myocardial infarction                         (1 Point)                  [ ] Congestive heart failure (in the previous month)  (1 Point)         [ ] Inflammatory bowel disease                            (1 Point)                  [ ] Central venous access, PICC or Port               (2 points)       (within the last month)                                                                [ ] Stroke (in the previous month)                        (5 Points)    [ ] Previous or present malignancy                       (2 points)                                                                                                                                                         HEMATOLOGY RELATED FACTORS                                                         [ ] Prior episodes of VTE                                     (3 Points)                     [ ] Positive family history for VTE                      (3 Points)                  [ ] Prothrombin 38042 A                                     (3 Points)                     [ ] Factor V Leiden                                                (3 Points)                        [ ] Lupus anticoagulants                                      (3 Points)                                                           [ ] Anticardiolipin antibodies                              (3 Points)                                                       [ ] High homocysteine in the blood                   (3 Points)                                             [ ] Other congenital or acquired thrombophilia      (3 Points)                                                [ ] Heparin induced thrombocytopenia                  (3 Points)                                        MOBILITY RELATED FACTORS  [ ] Bed rest                                                         (1 Point)  [ ] Plaster cast                                                    (2 points)  [ ] Bed bound for more than 72 hours           (2 Points)    GENDER SPECIFIC FACTORS  [ ] Pregnancy or had a baby within the last month   (1 Point)  [ ] Post-partum < 6 weeks                                   (1 Point)  [ ] Hormonal therapy  or oral contraception   (1 Point)  [ ] History of pregnancy complications              (1 point)  [ ] Unexplained or recurrent              (1 Point)    OTHER RISK FACTORS                                           (1 Point)  [ ] BMI >40, smoking, diabetes requiring insulin, chemotherapy  blood transfusions and length of surgery over 2 hours    SURGERY RELATED RISK FACTORS  [ ]  Section within the last month     (1 Point)  [ ] Minor surgery                                                  (1 Point)  [ ] Arthroscopic surgery                                       (2 Points)  [ ] Planned major surgery lasting more            (2 Points)      than 45 minutes     [ x] Elective hip or knee joint replacement       (5 points)       surgery                                                TRAUMA RELATED RISK FACTORS  [ ] Fracture of the hip, pelvis, or leg                       (5 Points)  [ ] Spinal cord injury resulting in paralysis             (5 points)       (in the previous month)    [ ] Paralysis  (less than 1 month)                             (5 Points)  [ ] Multiple Trauma within 1 month                        (5 Points)    Total Score [    7    ]    Caprini Score 0-2: Low Risk, mechanical prophylaxis (ie:compression devices)  Caprini Score 3-6: Moderate Risk , pharmacologic VTE prophylaxis is indicated for most patients (in the absence of contraindications)  Caprini Score Greater than or =7: High risk, pharmocologic VTE prophylaxis indicated for most patients (in the absence of contraindications) 54 y.o male with  unilateral primary osteoarthritis right knee and now for schedule Right Total Knee Replacement on 25 with Dr Michell VILLEGAS 3  CAPRINI SCORE 7    AGE RELATED RISK FACTORS                                                             [x] Age 41-60 years                                            (1 Point)  [ ] Age: 61-74 years                                           (2 Points)                 [ ] Age= 75 years                                                (3 Points)             DISEASE RELATED RISK FACTORS                                                       [ ] Edema in the lower extremities                 (1 Point)                     [ ] Varicose veins                                               (1 Point)                                 [ x] BMI > 25 Kg/m2                                            (1 Point)                                  [ ] Serious infection (ie PNA)                            (1 Point)                     [ ] Lung disease ( COPD, Emphysema)            (1 Point)                                                                          [ ] Acute myocardial infarction                         (1 Point)                  [ ] Congestive heart failure (in the previous month)  (1 Point)         [ ] Inflammatory bowel disease                            (1 Point)                  [ ] Central venous access, PICC or Port               (2 points)       (within the last month)                                                                [ ] Stroke (in the previous month)                        (5 Points)    [ ] Previous or present malignancy                       (2 points)                                                                                                                                                         HEMATOLOGY RELATED FACTORS                                                         [ ] Prior episodes of VTE                                     (3 Points)                     [ ] Positive family history for VTE                      (3 Points)                  [ ] Prothrombin 53404 A                                     (3 Points)                     [ ] Factor V Leiden                                                (3 Points)                        [ ] Lupus anticoagulants                                      (3 Points)                                                           [ ] Anticardiolipin antibodies                              (3 Points)                                                       [ ] High homocysteine in the blood                   (3 Points)                                             [ ] Other congenital or acquired thrombophilia      (3 Points)                                                [ ] Heparin induced thrombocytopenia                  (3 Points)                                        MOBILITY RELATED FACTORS  [ ] Bed rest                                                         (1 Point)  [ ] Plaster cast                                                    (2 points)  [ ] Bed bound for more than 72 hours           (2 Points)    GENDER SPECIFIC FACTORS  [ ] Pregnancy or had a baby within the last month   (1 Point)  [ ] Post-partum < 6 weeks                                   (1 Point)  [ ] Hormonal therapy  or oral contraception   (1 Point)  [ ] History of pregnancy complications              (1 point)  [ ] Unexplained or recurrent              (1 Point)    OTHER RISK FACTORS                                           (1 Point)  [ ] BMI >40, smoking, diabetes requiring insulin, chemotherapy  blood transfusions and length of surgery over 2 hours    SURGERY RELATED RISK FACTORS  [ ]  Section within the last month     (1 Point)  [ ] Minor surgery                                                  (1 Point)  [ ] Arthroscopic surgery                                       (2 Points)  [ ] Planned major surgery lasting more            (2 Points)      than 45 minutes     [ x] Elective hip or knee joint replacement       (5 points)       surgery                                                TRAUMA RELATED RISK FACTORS  [ ] Fracture of the hip, pelvis, or leg                       (5 Points)  [ ] Spinal cord injury resulting in paralysis             (5 points)       (in the previous month)    [ ] Paralysis  (less than 1 month)                             (5 Points)  [ ] Multiple Trauma within 1 month                        (5 Points)    Total Score [    7    ]    Caprini Score 0-2: Low Risk, mechanical prophylaxis (ie:compression devices)  Caprini Score 3-6: Moderate Risk , pharmacologic VTE prophylaxis is indicated for most patients (in the absence of contraindications)  Caprini Score Greater than or =7: High risk, pharmocologic VTE prophylaxis indicated for most patients (in the absence of contraindications)

## 2025-04-30 NOTE — H&P PST ADULT - PROBLEM SELECTOR PLAN 1
Pt schedule for Right Total Knee Replacement on 5/6/25 with Dr Galarza    Labs drawn in PCP - will /fu report   Pt was seen by PCP for Medical clearance- will f/u report     pt was swabbed for MRSA/MSSA -will f/u with result   Pt Instructed on use of Mupirocin if nasal swab positive    Pt was  instructed to stop aspirin/ecotrin and all over the counter medication including vitamins and herbal supplements one week prior to surgery   Instructions given on the use of 4% chlorhexidine wash and Pt verbalized understanding of same   Pt Instructed to have nothing by mouth starting midnight day before surgery  Patient is to expect a phone call day before surgery between the hours of 430- 630pm giving arrival time for surgery   Written and verbal preoperative instructions given to patient with understanding verbalized.   Post surgery instruction given by Chau, booklets and pamphlets also given    Patient today with STOP bang score 3  Low  risk for ROBERT Pt schedule for Right Total Knee Replacement on 5/6/25 with Dr Galarza    Labs drawn in PCP - will /fu report   Pt was seen by PCP for Medical clearance- will f/u report     pt was swabbed for MRSA/MSSA -will f/u with result   Pt Instructed on use of Mupirocin if nasal swab positive    Pt was  instructed to stop aspirin/ecotrin and all over the counter medication including vitamins and herbal supplements one week prior to surgery   Instructions given on the use of 4% chlorhexidine wash and Pt verbalized understanding of same   Pt Instructed to have nothing by mouth starting midnight day before surgery  Patient is to expect a phone call day before surgery between the hours of 430- 630pm giving arrival time for surgery   Written and verbal preoperative instructions given to patient with understanding verbalized.   Post surgery instruction to be given by Chau/Daisy, booklets and pamphlets also to be given    Patient today with STOP bang score 3  Low  risk for ROBERT

## 2025-04-30 NOTE — H&P PST ADULT - HISTORY OF PRESENT ILLNESS
54 y.o male with PMHx for Anxiety, BPH, Depression, Psychosis, Schizoaffective disorder . Now with c/o worsening right knee pain. on w/u found to have unilateral primary osteoarthritis right knee and now for schedule Right Total Knee Replacement on 5/6/25 with Dr Galarza 54 y.o male presents from [facility] assisted living, with PMHx for Anxiety, Depression, Psychosis, Schizoaffective disorder, BPH.  Now with c/o worsening right knee pain. on w/u found to have unilateral primary osteoarthritis right knee and now for schedule Right Total Knee Replacement on 5/6/25 with Dr Galarza

## 2025-04-30 NOTE — H&P PST ADULT - PATIENT OPTIMIZED FOR PROCEDURE
[facility] arrived early. Called [facility], they can accept pt 30 min earlier at 0230. Pt transported to [facility] in stable condition. Father has all belongings.   No

## 2025-04-30 NOTE — H&P PST ADULT - PROBLEM SELECTOR PROBLEM 2
48 Hour Assessment:  Pt attending and participating in unit groups/activities.  Pt appropriate and social with staff and peers.  Pt denies physical discomfort.  Pt denies medication AE.  Pt denies difficulty sleeping.  Pt denies AVH.  Pt eating and drinking without issue.  Will continue to assess and provide support as appropriate.          SI/Self harm: Chronic SI/SIB, thoughts only, denies plan or intent and is sarah for safety on the unit.    HI: Denied    AVH: Denies    Sleep: Denied issues    PRN: None    Medication AE: denied, none noted    Pain: denied    I & O: no issues    LBM: denies constipation or diarrhea    ADLs: independent    Visits: none    Vitals:  WNL           Schizoaffective disorder

## 2025-05-01 PROCEDURE — 87640 STAPH A DNA AMP PROBE: CPT

## 2025-05-01 PROCEDURE — 87641 MR-STAPH DNA AMP PROBE: CPT

## 2025-05-01 PROCEDURE — G0463: CPT

## 2025-05-13 ENCOUNTER — TRANSCRIPTION ENCOUNTER (OUTPATIENT)
Age: 55
End: 2025-05-13

## 2025-05-13 ENCOUNTER — INPATIENT (INPATIENT)
Facility: HOSPITAL | Age: 55
LOS: 2 days | Discharge: HOME HEALTH | DRG: 470 | End: 2025-05-16
Attending: ORTHOPAEDIC SURGERY | Admitting: ORTHOPAEDIC SURGERY
Payer: MEDICAID

## 2025-05-13 VITALS
HEIGHT: 70 IN | RESPIRATION RATE: 16 BRPM | SYSTOLIC BLOOD PRESSURE: 107 MMHG | TEMPERATURE: 98 F | HEART RATE: 81 BPM | WEIGHT: 240.08 LBS | OXYGEN SATURATION: 98 % | DIASTOLIC BLOOD PRESSURE: 72 MMHG

## 2025-05-13 DIAGNOSIS — M17.11 UNILATERAL PRIMARY OSTEOARTHRITIS, RIGHT KNEE: ICD-10-CM

## 2025-05-13 LAB
A1C WITH ESTIMATED AVERAGE GLUCOSE RESULT: 4.7 % — SIGNIFICANT CHANGE UP (ref 4–5.6)
ANION GAP SERPL CALC-SCNC: 6 MMOL/L — SIGNIFICANT CHANGE UP (ref 5–17)
BLD GP AB SCN SERPL QL: SIGNIFICANT CHANGE UP
BUN SERPL-MCNC: 7 MG/DL — SIGNIFICANT CHANGE UP (ref 7–18)
CALCIUM SERPL-MCNC: 8.8 MG/DL — SIGNIFICANT CHANGE UP (ref 8.4–10.5)
CHLORIDE SERPL-SCNC: 107 MMOL/L — SIGNIFICANT CHANGE UP (ref 96–108)
CO2 SERPL-SCNC: 26 MMOL/L — SIGNIFICANT CHANGE UP (ref 22–31)
CREAT SERPL-MCNC: 0.65 MG/DL — SIGNIFICANT CHANGE UP (ref 0.5–1.3)
EGFR: 112 ML/MIN/1.73M2 — SIGNIFICANT CHANGE UP
EGFR: 112 ML/MIN/1.73M2 — SIGNIFICANT CHANGE UP
ESTIMATED AVERAGE GLUCOSE: 88 MG/DL — SIGNIFICANT CHANGE UP (ref 68–114)
GLUCOSE BLDC GLUCOMTR-MCNC: 98 MG/DL — SIGNIFICANT CHANGE UP (ref 70–99)
GLUCOSE BLDC GLUCOMTR-MCNC: 99 MG/DL — SIGNIFICANT CHANGE UP (ref 70–99)
GLUCOSE SERPL-MCNC: 93 MG/DL — SIGNIFICANT CHANGE UP (ref 70–99)
HCT VFR BLD CALC: 36.1 % — LOW (ref 39–50)
HGB BLD-MCNC: 12.4 G/DL — LOW (ref 13–17)
MCHC RBC-ENTMCNC: 29.6 PG — SIGNIFICANT CHANGE UP (ref 27–34)
MCHC RBC-ENTMCNC: 34.3 G/DL — SIGNIFICANT CHANGE UP (ref 32–36)
MCV RBC AUTO: 86.2 FL — SIGNIFICANT CHANGE UP (ref 80–100)
NRBC BLD AUTO-RTO: 0 /100 WBCS — SIGNIFICANT CHANGE UP (ref 0–0)
PLATELET # BLD AUTO: 157 K/UL — SIGNIFICANT CHANGE UP (ref 150–400)
POTASSIUM SERPL-MCNC: 3.6 MMOL/L — SIGNIFICANT CHANGE UP (ref 3.5–5.3)
POTASSIUM SERPL-SCNC: 3.6 MMOL/L — SIGNIFICANT CHANGE UP (ref 3.5–5.3)
RBC # BLD: 4.19 M/UL — LOW (ref 4.2–5.8)
RBC # FLD: 13.5 % — SIGNIFICANT CHANGE UP (ref 10.3–14.5)
SODIUM SERPL-SCNC: 139 MMOL/L — SIGNIFICANT CHANGE UP (ref 135–145)
WBC # BLD: 3.92 K/UL — SIGNIFICANT CHANGE UP (ref 3.8–10.5)
WBC # FLD AUTO: 3.92 K/UL — SIGNIFICANT CHANGE UP (ref 3.8–10.5)

## 2025-05-13 PROCEDURE — 27637 REMOVE/GRAFT LEG BONE LESION: CPT | Mod: AS,59,RT

## 2025-05-13 PROCEDURE — 73560 X-RAY EXAM OF KNEE 1 OR 2: CPT | Mod: 26

## 2025-05-13 PROCEDURE — 27447 TOTAL KNEE ARTHROPLASTY: CPT | Mod: AS,RT

## 2025-05-13 PROCEDURE — 73560 X-RAY EXAM OF KNEE 1 OR 2: CPT | Mod: 26,RT

## 2025-05-13 PROCEDURE — 88311 DECALCIFY TISSUE: CPT | Mod: 26

## 2025-05-13 PROCEDURE — 27350 REMOVAL OF KNEECAP: CPT | Mod: AS,59,RT

## 2025-05-13 PROCEDURE — 88305 TISSUE EXAM BY PATHOLOGIST: CPT | Mod: 26

## 2025-05-13 DEVICE — INSERT TIB BEARING PS X3 SZ 6 11MM: Type: IMPLANTABLE DEVICE | Site: RIGHT | Status: FUNCTIONAL

## 2025-05-13 DEVICE — STRYKER PIN 1/8 X 3.5" LONG: Type: IMPLANTABLE DEVICE | Site: RIGHT | Status: FUNCTIONAL

## 2025-05-13 DEVICE — COMP PATELLA ASYMMETRIC X3 11X38MM: Type: IMPLANTABLE DEVICE | Site: RIGHT | Status: FUNCTIONAL

## 2025-05-13 DEVICE — CEMENT SIMPLEX P 40GM: Type: IMPLANTABLE DEVICE | Site: RIGHT | Status: FUNCTIONAL

## 2025-05-13 DEVICE — FEM DIST FIXATION PEG MOD TKS: Type: IMPLANTABLE DEVICE | Site: RIGHT | Status: FUNCTIONAL

## 2025-05-13 DEVICE — BASEPLATE TIB UNIV TRIATHLON SZ 6: Type: IMPLANTABLE DEVICE | Site: RIGHT | Status: FUNCTIONAL

## 2025-05-13 DEVICE — COMP FEM TRIATHLON PS SZ 5 RT: Type: IMPLANTABLE DEVICE | Site: RIGHT | Status: FUNCTIONAL

## 2025-05-13 RX ORDER — POLYETHYLENE GLYCOL 3350 17 G/17G
17 POWDER, FOR SOLUTION ORAL AT BEDTIME
Refills: 0 | Status: DISCONTINUED | OUTPATIENT
Start: 2025-05-13 | End: 2025-05-16

## 2025-05-13 RX ORDER — OXYCODONE HYDROCHLORIDE 30 MG/1
5 TABLET ORAL EVERY 4 HOURS
Refills: 0 | Status: DISCONTINUED | OUTPATIENT
Start: 2025-05-13 | End: 2025-05-16

## 2025-05-13 RX ORDER — OLANZAPINE 10 MG/1
10 TABLET ORAL AT BEDTIME
Refills: 0 | Status: DISCONTINUED | OUTPATIENT
Start: 2025-05-13 | End: 2025-05-16

## 2025-05-13 RX ORDER — FENTANYL CITRATE-0.9 % NACL/PF 100MCG/2ML
25 SYRINGE (ML) INTRAVENOUS
Refills: 0 | Status: DISCONTINUED | OUTPATIENT
Start: 2025-05-13 | End: 2025-05-13

## 2025-05-13 RX ORDER — ACETAMINOPHEN 500 MG/5ML
1000 LIQUID (ML) ORAL EVERY 6 HOURS
Refills: 0 | Status: COMPLETED | OUTPATIENT
Start: 2025-05-13 | End: 2025-05-14

## 2025-05-13 RX ORDER — TRAMADOL HYDROCHLORIDE 50 MG/1
50 TABLET, FILM COATED ORAL EVERY 8 HOURS
Refills: 0 | Status: DISCONTINUED | OUTPATIENT
Start: 2025-05-13 | End: 2025-05-16

## 2025-05-13 RX ORDER — CELECOXIB 50 MG/1
200 CAPSULE ORAL EVERY 24 HOURS
Refills: 0 | Status: DISCONTINUED | OUTPATIENT
Start: 2025-05-14 | End: 2025-05-16

## 2025-05-13 RX ORDER — KETOROLAC TROMETHAMINE 30 MG/ML
30 INJECTION, SOLUTION INTRAMUSCULAR; INTRAVENOUS EVERY 6 HOURS
Refills: 0 | Status: DISCONTINUED | OUTPATIENT
Start: 2025-05-13 | End: 2025-05-16

## 2025-05-13 RX ORDER — MAGNESIUM HYDROXIDE 400 MG/5ML
30 SUSPENSION ORAL DAILY
Refills: 0 | Status: DISCONTINUED | OUTPATIENT
Start: 2025-05-13 | End: 2025-05-16

## 2025-05-13 RX ORDER — ACETAMINOPHEN 500 MG/5ML
975 LIQUID (ML) ORAL ONCE
Refills: 0 | Status: COMPLETED | OUTPATIENT
Start: 2025-05-13 | End: 2025-05-13

## 2025-05-13 RX ORDER — SODIUM CHLORIDE 9 G/1000ML
1000 INJECTION, SOLUTION INTRAVENOUS
Refills: 0 | Status: DISCONTINUED | OUTPATIENT
Start: 2025-05-13 | End: 2025-05-13

## 2025-05-13 RX ORDER — ENOXAPARIN SODIUM 100 MG/ML
30 INJECTION SUBCUTANEOUS EVERY 12 HOURS
Refills: 0 | Status: DISCONTINUED | OUTPATIENT
Start: 2025-05-14 | End: 2025-05-16

## 2025-05-13 RX ORDER — SENNA 187 MG
2 TABLET ORAL AT BEDTIME
Refills: 0 | Status: DISCONTINUED | OUTPATIENT
Start: 2025-05-13 | End: 2025-05-16

## 2025-05-13 RX ORDER — FERROUS SULFATE 137(45) MG
325 TABLET, EXTENDED RELEASE ORAL DAILY
Refills: 0 | Status: DISCONTINUED | OUTPATIENT
Start: 2025-05-13 | End: 2025-05-16

## 2025-05-13 RX ORDER — TAMSULOSIN HYDROCHLORIDE 0.4 MG/1
0.4 CAPSULE ORAL AT BEDTIME
Refills: 0 | Status: DISCONTINUED | OUTPATIENT
Start: 2025-05-13 | End: 2025-05-16

## 2025-05-13 RX ORDER — OXYBUTYNIN CHLORIDE 5 MG/1
5 TABLET, FILM COATED, EXTENDED RELEASE ORAL DAILY
Refills: 0 | Status: DISCONTINUED | OUTPATIENT
Start: 2025-05-13 | End: 2025-05-16

## 2025-05-13 RX ORDER — CEFAZOLIN SODIUM IN 0.9 % NACL 3 G/100 ML
2000 INTRAVENOUS SOLUTION, PIGGYBACK (ML) INTRAVENOUS EVERY 8 HOURS
Refills: 0 | Status: COMPLETED | OUTPATIENT
Start: 2025-05-13 | End: 2025-05-14

## 2025-05-13 RX ORDER — CELECOXIB 50 MG/1
200 CAPSULE ORAL ONCE
Refills: 0 | Status: COMPLETED | OUTPATIENT
Start: 2025-05-13 | End: 2025-05-13

## 2025-05-13 RX ORDER — TRAMADOL HYDROCHLORIDE 50 MG/1
50 TABLET, FILM COATED ORAL ONCE
Refills: 0 | Status: DISCONTINUED | OUTPATIENT
Start: 2025-05-13 | End: 2025-05-13

## 2025-05-13 RX ORDER — ONDANSETRON HCL/PF 4 MG/2 ML
4 VIAL (ML) INJECTION EVERY 6 HOURS
Refills: 0 | Status: DISCONTINUED | OUTPATIENT
Start: 2025-05-13 | End: 2025-05-16

## 2025-05-13 RX ORDER — FENTANYL CITRATE-0.9 % NACL/PF 100MCG/2ML
50 SYRINGE (ML) INTRAVENOUS
Refills: 0 | Status: DISCONTINUED | OUTPATIENT
Start: 2025-05-13 | End: 2025-05-13

## 2025-05-13 RX ADMIN — Medication 1000 MILLIGRAM(S): at 18:12

## 2025-05-13 RX ADMIN — Medication 3 MILLILITER(S): at 21:22

## 2025-05-13 RX ADMIN — OLANZAPINE 10 MILLIGRAM(S): 10 TABLET ORAL at 21:27

## 2025-05-13 RX ADMIN — TAMSULOSIN HYDROCHLORIDE 0.4 MILLIGRAM(S): 0.4 CAPSULE ORAL at 21:27

## 2025-05-13 RX ADMIN — Medication 1 APPLICATION(S): at 08:53

## 2025-05-13 RX ADMIN — CELECOXIB 200 MILLIGRAM(S): 50 CAPSULE ORAL at 08:53

## 2025-05-13 RX ADMIN — POLYETHYLENE GLYCOL 3350 17 GRAM(S): 17 POWDER, FOR SOLUTION ORAL at 21:30

## 2025-05-13 RX ADMIN — Medication 40 MILLIGRAM(S): at 18:12

## 2025-05-13 RX ADMIN — KETOROLAC TROMETHAMINE 30 MILLIGRAM(S): 30 INJECTION, SOLUTION INTRAMUSCULAR; INTRAVENOUS at 21:32

## 2025-05-13 RX ADMIN — KETOROLAC TROMETHAMINE 30 MILLIGRAM(S): 30 INJECTION, SOLUTION INTRAMUSCULAR; INTRAVENOUS at 22:20

## 2025-05-13 RX ADMIN — TRAMADOL HYDROCHLORIDE 50 MILLIGRAM(S): 50 TABLET, FILM COATED ORAL at 08:52

## 2025-05-13 RX ADMIN — Medication 1 APPLICATION(S): at 09:09

## 2025-05-13 RX ADMIN — Medication 975 MILLIGRAM(S): at 08:52

## 2025-05-13 RX ADMIN — Medication 100 MILLIGRAM(S): at 20:17

## 2025-05-13 NOTE — PHYSICAL THERAPY INITIAL EVALUATION ADULT - GENERAL OBSERVATIONS, REHAB EVAL
Patient received walking in room with RN and sister at bedside, reports 7-8/10 pain to R knee, NAD, R knee ace bandage C/D/I, saline lock intact; cooperative to PT evaluation

## 2025-05-13 NOTE — BRIEF OPERATIVE NOTE - DISPOSITION
Biopsy-proven anti-GBM disease. Remains dialysis dependent since 7/11/2024.   Currently on p.o. Cytoxan and tapering prednisone  On HD TTS at [facility]   PACU to [facility]

## 2025-05-13 NOTE — PHYSICAL THERAPY INITIAL EVALUATION ADULT - PATIENT/FAMILY AGREES WITH PLAN
patient's sister adamant patient go to [facility] for couple weeks despite education on pt's current level of function/no

## 2025-05-13 NOTE — PHYSICAL THERAPY INITIAL EVALUATION ADULT - DIAGNOSIS, PT EVAL
(ICF Model) Pt. present w/deficits in Body Structures/Function (Impairments), incl: Strength, Balance, activity tolerance/endurance, pain to R knee, leading to deficits in performing the below noted Activities (Limitations).

## 2025-05-13 NOTE — PATIENT PROFILE ADULT - STATED REASON FOR ADMISSION
Within the past 24 hours (including today)
 Right Total Knee Replacement on 05/13/25 with Dr. Galarza.

## 2025-05-13 NOTE — PROGRESS NOTE ADULT - SUBJECTIVE AND OBJECTIVE BOX
Orthopedic Surgery     54yMale    Diagnosis:  S/p RIGHT Total Knee Replacement POD#0    24hr interval:   - none  Patient was accompanied by sister, at bedside, Nelly.   Patient was seen and evaluated at bedside. Patient with no acute complaints.   Pain is  well controlled.      Denies CP/SOB, dyspnea, paresthesias, N/V/D, palpitations.     Vital Signs Last 24 Hrs  T(C): 36.6 (13 May 2025 16:28), Max: 36.9 (13 May 2025 13:42)  T(F): 97.8 (13 May 2025 16:28), Max: 98.4 (13 May 2025 13:42)  HR: 62 (13 May 2025 16:28) (54 - 86)  BP: 98/61 (13 May 2025 16:28) (98/61 - 117/101)  BP(mean): 73 (13 May 2025 16:28) (73 - 106)  RR: 16 (13 May 2025 16:28) (13 - 22)  SpO2: 97% (13 May 2025 16:28) (97% - 100%)    Parameters below as of 13 May 2025 16:28  Patient On (Oxygen Delivery Method): room air      I&O's Detail    13 May 2025 07:01  -  13 May 2025 17:19  --------------------------------------------------------  IN:    Lactated Ringers Bolus: 1000 mL  Total IN: 1000 mL    OUT:    Blood Loss (mL): 100 mL  Total OUT: 100 mL    Total NET: 900 mL          Physical Exam:    General: AAOx3, NAD, resting comfortably in bed.  MSK:  Right knee:  Dressing is C/D/I.   Skin is pink and warm.    No erythema.   No wound drainage.   Lower extremities:  No calf tenderness, calves are soft.   2+pulses. NVI. 5/5 Strength of EHL/TA/gastrocnemius B/L.    Good capillary refill. SILT.                          12.4   3.92  )-----------( 157      ( 13 May 2025 13:49 )             36.1     05-13    139  |  107  |  7   ----------------------------<  93  3.6   |  26  |  0.65    Ca    8.8      13 May 2025 13:49        Impression:  54Ant S/p Right Total Knee Replacement POD#0  Plan:  -  Pain management  -  Dvt prophylaxis with Lovenox  -  Daily Physical Therapy:  WBAT of the right lower extremity with appropriate assistive device  -  Discharge planning   -  Continue with Post-op Antibiotics x 24hrs  -  Case d/w Dr. Haider

## 2025-05-13 NOTE — PHYSICAL THERAPY INITIAL EVALUATION ADULT - ACTIVE RANGE OF MOTION EXAMINATION, REHAB EVAL
R knee limited by Ace bandage s/p TKR/bilateral upper extremity Active ROM was WFL (within functional limits)/Left LE Active ROM was WFL (within functional limits)

## 2025-05-13 NOTE — PATIENT PROFILE ADULT - FALL HARM RISK - UNIVERSAL INTERVENTIONS
Bed in lowest position, wheels locked, appropriate side rails in place/Call bell, personal items and telephone in reach/Instruct patient to call for assistance before getting out of bed or chair/Non-slip footwear when patient is out of bed/[place] to call system/Physically safe environment - no spills, clutter or unnecessary equipment/Purposeful Proactive Rounding/Room/bathroom lighting operational, light cord in reach

## 2025-05-14 ENCOUNTER — TRANSCRIPTION ENCOUNTER (OUTPATIENT)
Age: 55
End: 2025-05-14

## 2025-05-14 DIAGNOSIS — Z96.651 PRESENCE OF RIGHT ARTIFICIAL KNEE JOINT: ICD-10-CM

## 2025-05-14 DIAGNOSIS — G89.18 OTHER ACUTE POSTPROCEDURAL PAIN: ICD-10-CM

## 2025-05-14 LAB
ANION GAP SERPL CALC-SCNC: 5 MMOL/L — SIGNIFICANT CHANGE UP (ref 5–17)
BUN SERPL-MCNC: 10 MG/DL — SIGNIFICANT CHANGE UP (ref 7–18)
CALCIUM SERPL-MCNC: 8.5 MG/DL — SIGNIFICANT CHANGE UP (ref 8.4–10.5)
CHLORIDE SERPL-SCNC: 109 MMOL/L — HIGH (ref 96–108)
CO2 SERPL-SCNC: 25 MMOL/L — SIGNIFICANT CHANGE UP (ref 22–31)
CREAT SERPL-MCNC: 0.63 MG/DL — SIGNIFICANT CHANGE UP (ref 0.5–1.3)
EGFR: 113 ML/MIN/1.73M2 — SIGNIFICANT CHANGE UP
EGFR: 113 ML/MIN/1.73M2 — SIGNIFICANT CHANGE UP
GLUCOSE SERPL-MCNC: 99 MG/DL — SIGNIFICANT CHANGE UP (ref 70–99)
HCT VFR BLD CALC: 32.1 % — LOW (ref 39–50)
HGB BLD-MCNC: 11 G/DL — LOW (ref 13–17)
MCHC RBC-ENTMCNC: 29.6 PG — SIGNIFICANT CHANGE UP (ref 27–34)
MCHC RBC-ENTMCNC: 34.3 G/DL — SIGNIFICANT CHANGE UP (ref 32–36)
MCV RBC AUTO: 86.3 FL — SIGNIFICANT CHANGE UP (ref 80–100)
NRBC BLD AUTO-RTO: 0 /100 WBCS — SIGNIFICANT CHANGE UP (ref 0–0)
PLATELET # BLD AUTO: 143 K/UL — LOW (ref 150–400)
POTASSIUM SERPL-MCNC: 3.5 MMOL/L — SIGNIFICANT CHANGE UP (ref 3.5–5.3)
POTASSIUM SERPL-SCNC: 3.5 MMOL/L — SIGNIFICANT CHANGE UP (ref 3.5–5.3)
RBC # BLD: 3.72 M/UL — LOW (ref 4.2–5.8)
RBC # FLD: 13.9 % — SIGNIFICANT CHANGE UP (ref 10.3–14.5)
SODIUM SERPL-SCNC: 139 MMOL/L — SIGNIFICANT CHANGE UP (ref 135–145)
WBC # BLD: 5.53 K/UL — SIGNIFICANT CHANGE UP (ref 3.8–10.5)
WBC # FLD AUTO: 5.53 K/UL — SIGNIFICANT CHANGE UP (ref 3.8–10.5)

## 2025-05-14 PROCEDURE — 71045 X-RAY EXAM CHEST 1 VIEW: CPT | Mod: 26

## 2025-05-14 PROCEDURE — 99222 1ST HOSP IP/OBS MODERATE 55: CPT

## 2025-05-14 RX ORDER — FERROUS SULFATE 137(45) MG
1 TABLET, EXTENDED RELEASE ORAL
Qty: 0 | Refills: 0 | DISCHARGE

## 2025-05-14 RX ORDER — GABAPENTIN 400 MG/1
1 CAPSULE ORAL
Qty: 0 | Refills: 0 | DISCHARGE

## 2025-05-14 RX ORDER — CELECOXIB 50 MG/1
1 CAPSULE ORAL
Qty: 0 | Refills: 0 | DISCHARGE

## 2025-05-14 RX ORDER — SENNA 187 MG
1 TABLET ORAL
Qty: 0 | Refills: 0 | DISCHARGE

## 2025-05-14 RX ORDER — OXYCODONE HYDROCHLORIDE AND ACETAMINOPHEN 10; 325 MG/1; MG/1
1 TABLET ORAL
Qty: 0 | Refills: 0 | DISCHARGE

## 2025-05-14 RX ORDER — ASPIRIN 325 MG
1 TABLET ORAL
Qty: 0 | Refills: 0 | DISCHARGE

## 2025-05-14 RX ORDER — RIVAROXABAN 10 MG/1
1 TABLET, FILM COATED ORAL
Qty: 0 | Refills: 0 | DISCHARGE

## 2025-05-14 RX ORDER — ACETAMINOPHEN 500 MG/5ML
650 LIQUID (ML) ORAL EVERY 6 HOURS
Refills: 0 | Status: DISCONTINUED | OUTPATIENT
Start: 2025-05-14 | End: 2025-05-16

## 2025-05-14 RX ORDER — POLYETHYLENE GLYCOL 3350 17 G/17G
17 POWDER, FOR SOLUTION ORAL
Qty: 0 | Refills: 0 | DISCHARGE

## 2025-05-14 RX ADMIN — KETOROLAC TROMETHAMINE 30 MILLIGRAM(S): 30 INJECTION, SOLUTION INTRAMUSCULAR; INTRAVENOUS at 09:15

## 2025-05-14 RX ADMIN — Medication 4 MILLIGRAM(S): at 05:24

## 2025-05-14 RX ADMIN — Medication 3 MILLILITER(S): at 22:00

## 2025-05-14 RX ADMIN — OXYBUTYNIN CHLORIDE 5 MILLIGRAM(S): 5 TABLET, FILM COATED, EXTENDED RELEASE ORAL at 11:29

## 2025-05-14 RX ADMIN — Medication 10 MILLIGRAM(S): at 06:16

## 2025-05-14 RX ADMIN — KETOROLAC TROMETHAMINE 30 MILLIGRAM(S): 30 INJECTION, SOLUTION INTRAMUSCULAR; INTRAVENOUS at 21:04

## 2025-05-14 RX ADMIN — ENOXAPARIN SODIUM 30 MILLIGRAM(S): 100 INJECTION SUBCUTANEOUS at 02:07

## 2025-05-14 RX ADMIN — Medication 75 MILLILITER(S): at 23:53

## 2025-05-14 RX ADMIN — Medication 150 MILLILITER(S): at 05:25

## 2025-05-14 RX ADMIN — Medication 3 MILLILITER(S): at 13:26

## 2025-05-14 RX ADMIN — Medication 650 MILLIGRAM(S): at 20:59

## 2025-05-14 RX ADMIN — TAMSULOSIN HYDROCHLORIDE 0.4 MILLIGRAM(S): 0.4 CAPSULE ORAL at 21:02

## 2025-05-14 RX ADMIN — POLYETHYLENE GLYCOL 3350 17 GRAM(S): 17 POWDER, FOR SOLUTION ORAL at 21:02

## 2025-05-14 RX ADMIN — KETOROLAC TROMETHAMINE 30 MILLIGRAM(S): 30 INJECTION, SOLUTION INTRAMUSCULAR; INTRAVENOUS at 21:45

## 2025-05-14 RX ADMIN — Medication 100 MILLIGRAM(S): at 03:01

## 2025-05-14 RX ADMIN — Medication 1000 MILLIGRAM(S): at 06:16

## 2025-05-14 RX ADMIN — ENOXAPARIN SODIUM 30 MILLIGRAM(S): 100 INJECTION SUBCUTANEOUS at 15:00

## 2025-05-14 RX ADMIN — Medication 10 MILLIGRAM(S): at 17:36

## 2025-05-14 RX ADMIN — KETOROLAC TROMETHAMINE 30 MILLIGRAM(S): 30 INJECTION, SOLUTION INTRAMUSCULAR; INTRAVENOUS at 10:26

## 2025-05-14 RX ADMIN — Medication 3 MILLILITER(S): at 05:21

## 2025-05-14 RX ADMIN — Medication 325 MILLIGRAM(S): at 11:29

## 2025-05-14 RX ADMIN — Medication 10 MILLIGRAM(S): at 11:29

## 2025-05-14 NOTE — OCCUPATIONAL THERAPY INITIAL EVALUATION ADULT - DIAGNOSIS, OT EVAL
Decreased strength, endurance, and balance impacting ability to participate in ADLs, functional mobility, and functional transfers.

## 2025-05-14 NOTE — DISCHARGE NOTE PROVIDER - HOSPITAL COURSE
53 y/o male admitted for right total knee replacement  patient kept on enhanced supervision as he was attempting to crawl on all fours off the bed.   otherwise uncomplicated hospital stay  followed by physical therapy and pain management  cleared for discharge home

## 2025-05-14 NOTE — OCCUPATIONAL THERAPY INITIAL EVALUATION ADULT - GENERAL OBSERVATIONS, REHAB EVAL
Patient received supine in bed, PCA at bedside, reports 9/10 pain to R knee, NAD, R knee ace bandage, saline lock intact, agreeable to OT evaluation

## 2025-05-14 NOTE — DISCHARGE NOTE NURSING/CASE MANAGEMENT/SOCIAL WORK - PATIENT PORTAL LINK FT
You can access the FollowMyHealth Patient Portal offered by [facility] by registering at the following website: [email or web address]. By joining [facility]’s FollowMyHealth portal, you will also be able to view your health information using other applications (apps) compatible with our system.

## 2025-05-14 NOTE — DISCHARGE NOTE PROVIDER - CARE PROVIDER_API CALL
Baltazar Galarza  Orthopaedic Surgery  [address] [place] [address], [address]  [place] [address]  Phone: [phone]  Fax: [phone]  Follow Up Time: 2 weeks

## 2025-05-14 NOTE — DISCHARGE NOTE PROVIDER - NSDCCPTREATMENT_GEN_ALL_CORE_FT
PRINCIPAL PROCEDURE  Procedure: Right total knee replacement  Findings and Treatment: If patient has drainage from the wound, please contact the surgeon's office.   If patient has intractable pain, please contact the surgeon's office.   If excessively warm at the incision site is noted, please contact the surgeon's office.   If redness is noted, especially spreading, please contact the surgeon's office.   If patient has fever over 101F please return to the hospital through the Emergency Room.   If yesenia blood is saturating the dressing, please return to the hospital through the Emergency Room.  If drainage of fluid or pus is noted, please return to the hospital through the Emergency Room.

## 2025-05-14 NOTE — DISCHARGE NOTE PROVIDER - NSDCHHBASESERVICE_GEN_ALL_CORE
Saline nasal spray such as Ocean or Simply Saline to nostrils 2-3 times daily to soothe tissues and keep mucus thin. Then perform salt water gargles (1 tsp. Salt in 6 oz lukewarm water), use several times daily to remove post nasal drainage and soothe throat.  Hydrate! (cold or hot based on comfort). Drink lots of water or other non dehydrating liquids to help with illness. Throat lozenges or other hard candy can soothe throat while awake.   Use humidifier in bedroom for congestion. Hot steamy showers can loosen congestion and help cough. Valdo's vapor rub to chest can quiet cough. Keep water by your bed side to sip on if you awaken with cough/sore throat.  Hand washing-use hand  or wash hands frequently, cover your cough or sneeze, do not share towels or drinks with others.  May use Tylenol or Ibuprofen for pain/comfort if not contraindicated.  No work or school until fever free for 24 hours and respiratory symptoms are mostly improved.  Follow up with primary care in two weeks if symptoms not completely resolved post walk in visit, or seek immediate care if symptoms significantly worsen.     Nursing/Physical therapy

## 2025-05-14 NOTE — DISCHARGE NOTE NURSING/CASE MANAGEMENT/SOCIAL WORK - NSDCPEFALRISK_GEN_ALL_CORE
For information on Fall & Injury Prevention, visit: [email or web address] OR  [email or web address] OR  https://www.cdc.gov/steadi/patient.html

## 2025-05-14 NOTE — DISCHARGE NOTE NURSING/CASE MANAGEMENT/SOCIAL WORK - FINANCIAL ASSISTANCE
[facility] provides services at a reduced cost to those who are determined to be eligible through [facility]’s financial assistance program. Information regarding [facility]’s financial assistance program can be found by going to [email or web address] or by calling [phone].

## 2025-05-14 NOTE — CONSULT NOTE ADULT - ASSESSMENT
Confidential Drug Utilization Report  Search Terms: Shun Cesar, 1970Search Date: 05/14/2025 12:12:47 PM  The Drug Utilization Report below displays all of the controlled substance prescriptions, if any, that your patient has filled in the last twelve months. The information displayed on this report is compiled from pharmacy submissions to the Department, and accurately reflects the information as submitted by the pharmacies.    This report was requested by: Caprice Moya | Reference #: [id number]    You have not added a NAMAN number. Keeping your NAMAN number(s) up to date on the My NAMAN # tab will enable the separation of your prescriptions from others in the search results.    Practitioner Count: 1  Pharmacy Count: 1  Current Opioid Prescriptions: 0  Current Benzodiazepine Prescriptions: 0  Current Stimulant Prescriptions: 0      Patient Demographic Information (PDI)       PDI	First Name	Last Name	Birth Date	Gender	Street Address	[facility]	Zip Code  A	Shun Cesar	1970	Male	[address]	[place] [facility]	[address]    Prescription Information      PDI Filter:    PDI	Current Rx	Drug Type	Rx Written	Rx Dispensed	Drug	Quantity	Days Supply  A	N	O	03/25/2025	03/28/2025	oxycodone-acetaminophen 7.5-325 mg tablet	28	7  Prescriber Name Baltazar Galarza MD  Prescriber NAMAN # [id number]  Payment Method Medicaid  Dispenser [facility]    * - Details of Drug Type : O = Opioid, B = Benzodiazepine, S = Stimulant    * - Drugs marked with an asterisk are compound drugs. If the compound drug is made up of more than one controlled substance, then each controlled substance will be a separate row in the table.

## 2025-05-14 NOTE — DISCHARGE NOTE PROVIDER - NSDCFUSCHEDAPPT_GEN_ALL_CORE_FT
Roman Sheikh  [facility] Physician [facility]  UROLOGY [address]  Scheduled Appointment: 05/21/2025

## 2025-05-14 NOTE — CONSULT NOTE ADULT - SUBJECTIVE AND OBJECTIVE BOX
Source of information: ALVARO CHRISTENSEN, Chart review  Patient language: English  : n/a    HPI:  55 y/o male, from [facility], with PMH of Asthma, Schizophrenia, Osteoarthritis, BPH, Urinary Incontinence. No PSH.  Pt c/o progressively worsening right knee pain for the past 7 years.  Pt presents to pre-surgical evaluation now scheduled for Right Total Knee Replacement on 4/1/25 with Dr. Galarza.  (26 Mar 2025 13:49)    Pt is admitted for scheduled right knee surgery with MD Galarza. Pain consulted for post operative right knee pain. Pt is s/p Right Total Knee Replacement on 5/13. POD#1. Pt seen and examined at bedside this morning. Pt found lying in bed, resting, no acute distress noted. Pt responded to verbal stimuli, A&Ox3, speech clear. Reports pain score 10/10 on right knee at present time. Of note, pt was sleeping, no facial grimacing noted. SCALE USED: (1-10 VNRS). Discussed with RN, to medicate pt with PRN pain meds as orderd. Pt describes pain as aching, and radiating to right leg, alleviated by pain medication and exacerbated by movement and ambulation. Pt tolerating PO diet. Denies lethargy, nausea, vomiting, constipation, itchiness. Reports last BM Monday 5/12. Patient stated goal for pain control: to be able to take deep breaths, get out of bed to chair and ambulate with tolerable pain control. Pt reports taking Tylenol for pain at home and baseline independently. Pt instructed to use PRN pain meds as needed for pain control. Plan MARIVEL CHING.    PAST MEDICAL & SURGICAL HISTORY:  Anxiety    BPH (benign prostatic hyperplasia)    Schizoaffective disorder    Depression    Psychosis    No significant past surgical history    FAMILY HISTORY:    Social History:   [x] Denies ETOH use, illicit drug use and smoking    Allergies    butyrophenones (Unknown)  phenothiazines (Unknown)  thioxanthenes (Unknown)  Haldol (Unknown)    Intolerances    MEDICATIONS  (STANDING):  celecoxib 200 milliGRAM(s) Oral every 24 hours  dicyclomine 10 milliGRAM(s) Oral three times a day before meals  enoxaparin Injectable 30 milliGRAM(s) SubCutaneous every 12 hours  ferrous    sulfate 325 milliGRAM(s) Oral daily  OLANZapine 10 milliGRAM(s) Oral at bedtime  oxybutynin 5 milliGRAM(s) Oral daily  pantoprazole    Tablet 40 milliGRAM(s) Oral before breakfast  polyethylene glycol 3350 17 Gram(s) Oral at bedtime  propranolol 10 milliGRAM(s) Oral daily  senna 2 Tablet(s) Oral at bedtime  sodium chloride 0.9% lock flush 3 milliLiter(s) IV Push every 8 hours  sodium chloride 0.9%. 1000 milliLiter(s) (150 mL/Hr) IV Continuous <Continuous>  tamsulosin 0.4 milliGRAM(s) Oral at bedtime  traMADol 50 milliGRAM(s) Oral every 8 hours    MEDICATIONS  (PRN):  ketorolac   Injectable 30 milliGRAM(s) IV Push every 6 hours PRN Severe Pain (7 - 10)  magnesium hydroxide Suspension 30 milliLiter(s) Oral daily PRN Constipation  ondansetron Injectable 4 milliGRAM(s) IV Push every 6 hours PRN Nausea and/or Vomiting  oxyCODONE    IR 5 milliGRAM(s) Oral every 4 hours PRN Moderate Pain (4 - 6)    Vital Signs Last 24 Hrs  T(C): 37.1 (14 May 2025 05:15), Max: 37.1 (13 May 2025 21:20)  T(F): 98.8 (14 May 2025 05:15), Max: 98.8 (14 May 2025 05:15)  HR: 68 (14 May 2025 10:49) (54 - 86)  BP: 107/62 (14 May 2025 10:49) (91/50 - 129/63)  BP(mean): 64 (14 May 2025 06:52) (64 - 106)  RR: 16 (14 May 2025 06:52) (13 - 22)  SpO2: 98% (14 May 2025 06:52) (96% - 100%)    Parameters below as of 14 May 2025 06:52  Patient On (Oxygen Delivery Method): room air    LABS: Reviewed.                        11.0   5.53  )-----------( 143      ( 14 May 2025 07:25 )             32.1     05-14    139  |  109[H]  |  10  ----------------------------<  99  3.5   |  25  |  0.63    Ca    8.5      14 May 2025 07:25    Urinalysis Basic - ( 14 May 2025 07:25 )    Color: x / Appearance: x / SG: x / pH: x  Gluc: 99 mg/dL / Ketone: x  / Bili: x / Urobili: x   Blood: x / Protein: x / Nitrite: x   Leuk Esterase: x / RBC: x / WBC x   Sq Epi: x / Non Sq Epi: x / Bacteria: x    CAPILLARY BLOOD GLUCOSE    POCT Blood Glucose.: 99 mg/dL (13 May 2025 13:49)    Radiology: n/a    ORT Score -   Family Hx of substance abuse	Female	      Male  Alcohol 	                                           1                     3  Illegal drugs	                                   2                     3  Rx drugs                                           4 	                  4  Personal Hx of substance abuse		  Alcohol 	                                          3	                  3  Illegal drugs                                     4	                  4  Rx drugs                                            5 	                  5  Age between 16- 45 years	           1                     1  hx preadolescent sexual abuse	   3 	                  0  Psychological disease		  ADD, OCD, bipolar, schizophrenia   2	          2  Depression                                           1 	          1  Total: 3    a score of 3 or lower indicates low risk for opioid abuse		  a score of 4-7 indicates moderate risk for opioid abuse		  a score of 8 or higher indicates high risk for opioid abuse  	  REVIEW OF SYSTEMS:  CONSTITUTIONAL: No fever or fatigue  HEENT:  No difficulty hearing, no change in vision  NECK: No pain or stiffness  RESPIRATORY: No cough, wheezing, chills or hemoptysis; No shortness of breath  CARDIOVASCULAR: No chest pain, palpitations, dizziness, or leg swelling  GASTROINTESTINAL: No loss of appetite, decreased PO intake. No abdominal or epigastric pain. No nausea, vomiting; No diarrhea or constipation.   GENITOURINARY: No dysuria, frequency, hematuria, retention or incontinence  MUSCULOSKELETAL: + right knee pain and swelling; No muscle, back, or extremity pain, no upper or lower motor strength weakness, no saddle anesthesia, bowel/bladder incontinence, no falls   NEURO: No headaches, No numbness/tingling b/l LE, No weakness  ENDOCRINE: No polyuria, polydipsia, heat or cold intolerance; No hair loss  PSYCHIATRIC: Hx of depression, anxiety, Schizoaffective disorder, no difficulty sleeping    PHYSICAL EXAM:  GENERAL:  Alert & Oriented X4, cooperative, NAD, Good concentration. Speech is clear.   RESPIRATORY: Respirations even and unlabored. Clear to auscultation bilaterally; No rales, rhonchi, wheezing, or rubs  CARDIOVASCULAR: Normal S1/S2, regular rate and rhythm; No murmurs, rubs, or gallops. No JVD.   GASTROINTESTINAL:  Soft, Nontender, Nondistended; Bowel sounds present  PERIPHERAL VASCULAR:  Extremities warm without edema. 2+ Peripheral Pulses, No cyanosis, No calf tenderness  MUSCULOSKELETAL: Motor Strength 5/5 B/L upper and left lower extremities; moves all extremities equally against gravity; ROM decreased to RLE due to surgical pain, + tenderness on palpation of right knee  SKIN: Warm, dry, intact. No rashes, lesions, scars or wounds. Right knee with ace wrap dressing in place, CDI    Risk factors associated with adverse outcomes related to opioid treatment  [ ]  Concurrent benzodiazepine use  [ ]  History/ Active substance use or alcohol use disorder  [x] Psychiatric co-morbidity  [ ] Sleep apnea  [ ] COPD  [ ] BMI> 35  [ ] Liver dysfunction  [ ] Renal dysfunction  [ ] CHF  [ ] Smoker  [ ]  Age > 60 years    [x]  [place]  Reviewed and Copied to Chart. See below.    Plan of care and goal oriented pain management treatment options were discussed with patient and /or primary care giver; all questions and concerns were addressed and care was aligned with patient's wishes.    Educated patient on goal oriented pain management treatment options

## 2025-05-14 NOTE — CONSULT NOTE ADULT - PROBLEM SELECTOR RECOMMENDATION 9
Pt with acute postoperative right knee pain which is somatic and neuropathic in nature due to surgical procedure. Pt is s/p Right Total Knee Replacement on 5/13. POD#1.  Opioid pain recommendations   - Continue Tramadol 50mg PO q 8 hours. Monitor for sedation/ respiratory depression.   - Continue Oxycodone 5 mg PO q 4 hours PRN moderate pain. Monitor for sedation/ respiratory depression.   Non-opioid pain recommendations   - Continue Toradol 30mg IVP q 6 hours PRN severe pain  - Continue Acetaminophen 1 gram PO q 6 hours for 24 hours only, then q8h x 3 days. Monitor LFTs  - Continue Celebrex 200mg PO daily  Bowel Regimen  - Continue Miralax 17G PO daily  - Continue Senna 2 tablets at bedtime for constipation  Mild pain   - Non-pharmacological pain treatment recommendations  - Warm/ Cool packs PRN   - Repositioning extremity, elevation, imagery, relaxation, distraction.  - Physical therapy OOB if no contraindications   Recommendations discussed with primary team and RN.  Upon discharge – dc on Celebrex 200mg po daily, and Percocet 5/325mg po q 6 hours prn for 1 week. Pt to take OTC stool softeners

## 2025-05-14 NOTE — DISCHARGE NOTE PROVIDER - NSDCCPCAREPLAN_GEN_ALL_CORE_FT
PRINCIPAL DISCHARGE DIAGNOSIS  Diagnosis: Osteoarthritis of right knee  Assessment and Plan of Treatment: Pain Management- See Attached Medication Reconciliation  **StretchStrap Stretching exercises for Total knee replacement***  Weight Bearing Status: WBAT RLE  Equipment needs: Commode, Walker  Dressing: Please keep bandage/dressing Clean, Dry, and Intact.  Incision Site: REMOVE STAPLES on 5/28/25  STAPLES AND DRESSING TO BE REMOVED BY NURSE  NURSE TO APPLY STERI-STRIPS TO THE WOUND AFTER STAPLE REMOVAL   Dvt prophylaxis: D/C aspirin after 15 days and after 12 days of xarelto  PT/Occupational Therapy are Activities of Daily Living as appropriate  Follow up with Orthopedic Surgeon after STAPLES ARE REMOVED at:   Follow-up with Dr. Galarza in 2 WEEKS at [phone]   Showering allowed once staples are removed in 14 days or with waterproof dressing     PRINCIPAL DISCHARGE DIAGNOSIS  Diagnosis: Osteoarthritis of right knee  Assessment and Plan of Treatment: Pain Management- See Attached Medication Reconciliation  **StretchStrap Stretching exercises for Total knee replacement***  Weight Bearing Status: WBAT RLE  Equipment needs: Commode, Walker  Dressing: Please keep bandage/dressing Clean, Dry, and Intact.  Incision Site: REMOVE STAPLES on 5/28/25  STAPLES AND DRESSING TO BE REMOVED BY NURSE  NURSE TO APPLY STERI-STRIPS TO THE WOUND AFTER STAPLE REMOVAL   Dvt prophylaxis: D/C aspirin after 15 days and after 12 days of xarelto  PT/Occupational Therapy are Activities of Daily Living as appropriate  Follow up with Orthopedic Surgeon after STAPLES ARE REMOVED at:   Follow-up with Dr. Galarza in 2 WEEKS at [phone]   Showering allowed once staples are removed in 14 days or with waterproof dressing      SECONDARY DISCHARGE DIAGNOSES  Diagnosis: BPH (benign prostatic hyperplasia)  Assessment and Plan of Treatment: Continue with home medications    Diagnosis: Schizoaffective disorder  Assessment and Plan of Treatment: Continue with home medications

## 2025-05-14 NOTE — PROGRESS NOTE ADULT - SUBJECTIVE AND OBJECTIVE BOX
[id number]    Diagnosis: S/p Right Total Knee Replacement POD#1    Patient was seen and evaluated at bedside.   Patient with no acute complaints.   Pain is mild and well controlled with pain medication.   Patient has been OOB with PT.  Pt denies Fever, Chest pain, SOB, dyspnea, paresthesias, N/V/D, abdominal pain, syncope, or pain anywhere else.     Vital Signs Last 24 Hrs  T(C): 37.1 (14 May 2025 05:15), Max: 37.1 (13 May 2025 21:20)  T(F): 98.8 (14 May 2025 05:15), Max: 98.8 (14 May 2025 05:15)  HR: 65 (14 May 2025 06:52) (54 - 86)  BP: 91/50 (14 May 2025 06:52) (91/50 - 129/63)  BP(mean): 64 (14 May 2025 06:52) (64 - 106)  RR: 16 (14 May 2025 06:52) (13 - 22)  SpO2: 98% (14 May 2025 06:52) (96% - 100%)    Parameters below as of 14 May 2025 06:52  Patient On (Oxygen Delivery Method): room air        05-13-25 @ 07:01  -  05-14-25 @ 07:00  --------------------------------------------------------  IN: 2950 mL / OUT: 100 mL / NET: 2850 mL        Physical Exam:  General: AAOx3, NAD, resting comfortably in bed.  Right knee:  Dressing changed. Dressing is C/D/I. Skin is pink and warm. SILT.  No drainage. Staples intact. Wound appears well healing.   Lower extremities:  No calf tenderness, calves are soft. 2+pulses. NVI. 5/5 Strength of EHL/FHL/ADF/APF.  Good capillary refill. SILT.                          12.4   3.92  )-----------( 157      ( 13 May 2025 13:49 )             36.1     05-13    139  |  107  |  7   ----------------------------<  93  3.6   |  26  |  0.65    Ca    8.8      13 May 2025 13:49        Impression:  54yMale S/p Right Total Knee Replacement POD#1  Plan:  -  Pain management  -  DVT prophylaxis with Lovenox and venodynes  -  Daily Physical Therapy:  WBAT of the Right lower extremity with appropriate assistive device   -  Heel bump to RLE while lying in bed  -  Discharge planning: Home Vs. Rehab pending Physical therapy eval.  -  Continue with Post-op Antibiotics x 24hrs  -  Dressing changed today to [facility]  -  Incentive spirometry encouraged.   -  Case d/w Dr. Galarza

## 2025-05-15 LAB
ANION GAP SERPL CALC-SCNC: 7 MMOL/L — SIGNIFICANT CHANGE UP (ref 5–17)
APPEARANCE UR: CLEAR — SIGNIFICANT CHANGE UP
BILIRUB UR-MCNC: NEGATIVE — SIGNIFICANT CHANGE UP
BUN SERPL-MCNC: 8 MG/DL — SIGNIFICANT CHANGE UP (ref 7–18)
CALCIUM SERPL-MCNC: 8.2 MG/DL — LOW (ref 8.4–10.5)
CHLORIDE SERPL-SCNC: 108 MMOL/L — SIGNIFICANT CHANGE UP (ref 96–108)
CO2 SERPL-SCNC: 24 MMOL/L — SIGNIFICANT CHANGE UP (ref 22–31)
COLOR SPEC: YELLOW — SIGNIFICANT CHANGE UP
CREAT SERPL-MCNC: 0.63 MG/DL — SIGNIFICANT CHANGE UP (ref 0.5–1.3)
DIFF PNL FLD: NEGATIVE — SIGNIFICANT CHANGE UP
EGFR: 113 ML/MIN/1.73M2 — SIGNIFICANT CHANGE UP
EGFR: 113 ML/MIN/1.73M2 — SIGNIFICANT CHANGE UP
GLUCOSE SERPL-MCNC: 93 MG/DL — SIGNIFICANT CHANGE UP (ref 70–99)
GLUCOSE UR QL: NEGATIVE MG/DL — SIGNIFICANT CHANGE UP
HCT VFR BLD CALC: 28.9 % — LOW (ref 39–50)
HGB BLD-MCNC: 9.9 G/DL — LOW (ref 13–17)
KETONES UR QL: NEGATIVE MG/DL — SIGNIFICANT CHANGE UP
LEUKOCYTE ESTERASE UR-ACNC: NEGATIVE — SIGNIFICANT CHANGE UP
MCHC RBC-ENTMCNC: 30 PG — SIGNIFICANT CHANGE UP (ref 27–34)
MCHC RBC-ENTMCNC: 34.3 G/DL — SIGNIFICANT CHANGE UP (ref 32–36)
MCV RBC AUTO: 87.6 FL — SIGNIFICANT CHANGE UP (ref 80–100)
NITRITE UR-MCNC: NEGATIVE — SIGNIFICANT CHANGE UP
NRBC BLD AUTO-RTO: 0 /100 WBCS — SIGNIFICANT CHANGE UP (ref 0–0)
PH UR: 6.5 — SIGNIFICANT CHANGE UP (ref 5–8)
PLATELET # BLD AUTO: 126 K/UL — LOW (ref 150–400)
POTASSIUM SERPL-MCNC: 3.3 MMOL/L — LOW (ref 3.5–5.3)
POTASSIUM SERPL-SCNC: 3.3 MMOL/L — LOW (ref 3.5–5.3)
PROT UR-MCNC: NEGATIVE MG/DL — SIGNIFICANT CHANGE UP
RBC # BLD: 3.3 M/UL — LOW (ref 4.2–5.8)
RBC # FLD: 13.4 % — SIGNIFICANT CHANGE UP (ref 10.3–14.5)
SODIUM SERPL-SCNC: 139 MMOL/L — SIGNIFICANT CHANGE UP (ref 135–145)
SP GR SPEC: 1 — LOW (ref 1–1.03)
UROBILINOGEN FLD QL: 1 MG/DL — SIGNIFICANT CHANGE UP (ref 0.2–1)
WBC # BLD: 6.24 K/UL — SIGNIFICANT CHANGE UP (ref 3.8–10.5)
WBC # FLD AUTO: 6.24 K/UL — SIGNIFICANT CHANGE UP (ref 3.8–10.5)

## 2025-05-15 PROCEDURE — 99232 SBSQ HOSP IP/OBS MODERATE 35: CPT

## 2025-05-15 RX ADMIN — Medication 40 MILLIGRAM(S): at 06:14

## 2025-05-15 RX ADMIN — TRAMADOL HYDROCHLORIDE 50 MILLIGRAM(S): 50 TABLET, FILM COATED ORAL at 21:55

## 2025-05-15 RX ADMIN — TRAMADOL HYDROCHLORIDE 50 MILLIGRAM(S): 50 TABLET, FILM COATED ORAL at 15:30

## 2025-05-15 RX ADMIN — Medication 10 MILLIGRAM(S): at 11:57

## 2025-05-15 RX ADMIN — Medication 20 MILLIEQUIVALENT(S): at 09:32

## 2025-05-15 RX ADMIN — Medication 325 MILLIGRAM(S): at 11:57

## 2025-05-15 RX ADMIN — OLANZAPINE 10 MILLIGRAM(S): 10 TABLET ORAL at 21:55

## 2025-05-15 RX ADMIN — TAMSULOSIN HYDROCHLORIDE 0.4 MILLIGRAM(S): 0.4 CAPSULE ORAL at 21:55

## 2025-05-15 RX ADMIN — Medication 2 TABLET(S): at 21:55

## 2025-05-15 RX ADMIN — Medication 3 MILLILITER(S): at 21:12

## 2025-05-15 RX ADMIN — Medication 1000 MILLIGRAM(S): at 19:45

## 2025-05-15 RX ADMIN — Medication 3 MILLILITER(S): at 15:23

## 2025-05-15 RX ADMIN — ENOXAPARIN SODIUM 30 MILLIGRAM(S): 100 INJECTION SUBCUTANEOUS at 02:33

## 2025-05-15 RX ADMIN — TRAMADOL HYDROCHLORIDE 50 MILLIGRAM(S): 50 TABLET, FILM COATED ORAL at 16:16

## 2025-05-15 RX ADMIN — POLYETHYLENE GLYCOL 3350 17 GRAM(S): 17 POWDER, FOR SOLUTION ORAL at 21:55

## 2025-05-15 RX ADMIN — Medication 3 MILLILITER(S): at 05:31

## 2025-05-15 RX ADMIN — ENOXAPARIN SODIUM 30 MILLIGRAM(S): 100 INJECTION SUBCUTANEOUS at 15:29

## 2025-05-15 RX ADMIN — OXYBUTYNIN CHLORIDE 5 MILLIGRAM(S): 5 TABLET, FILM COATED, EXTENDED RELEASE ORAL at 11:57

## 2025-05-15 RX ADMIN — Medication 10 MILLIGRAM(S): at 06:14

## 2025-05-15 NOTE — PROGRESS NOTE ADULT - SUBJECTIVE AND OBJECTIVE BOX
Source of information: ALVARO CHRISTENSEN, Chart review  Patient language: English  : n/a    HPI:  53 y/o male, from [facility], with PMH of Asthma, Schizophrenia, Osteoarthritis, BPH, Urinary Incontinence.   No PSH.  Pt c/o progressively worsening right knee pain for the past 7 years.  Pt presents to pre-surgical evaluation now scheduled for Right Total Knee Replacement on 25 with Dr. Galarza.  (26 Mar 2025 13:49)    Pt is admitted for scheduled right knee surgery with MD Galarza. Pain consulted for post operative right knee pain. Pt is s/p Right Total Knee Replacement on . POD#2. Pt seen and examined at bedside this morning. Pt found OOB ambulating to bed from bathroom using walker with tolerable pain, no distress noted or facial grimacing observed. Pt A&Ox3, speech clear. Reports pain score 8/10 on right knee at present time, tolerable. SCALE USED: (1-10 VNRS). Pt describes pain as aching, and radiating to right leg, alleviated by pain medication and exacerbated by movement and ambulation. Pt tolerating PO diet. Denies lethargy, nausea, vomiting, constipation, itchiness. Reports last BM Monday today 5/15, but reports "very small". Patient stated goal for pain control: to be able to take deep breaths, get out of bed to chair and ambulate with tolerable pain control. Pt reports taking Tylenol for pain at home and baseline independently. Pt instructed to use PRN pain meds as needed for pain control. Plan MARIVEL CHING.    PAST MEDICAL & SURGICAL HISTORY:  Anxiety    BPH (benign prostatic hyperplasia)    Schizoaffective disorder    Depression    Psychosis    No significant past surgical history    FAMILY HISTORY:    Social History:   [x]Denies ETOH use, illicit drug use, and smoking     Allergies    butyrophenones (Unknown)  phenothiazines (Unknown)  thioxanthenes (Unknown)  Haldol (Unknown)    Intolerances    MEDICATIONS  (STANDING):  celecoxib 200 milliGRAM(s) Oral every 24 hours  dicyclomine 10 milliGRAM(s) Oral three times a day before meals  enoxaparin Injectable 30 milliGRAM(s) SubCutaneous every 12 hours  ferrous    sulfate 325 milliGRAM(s) Oral daily  OLANZapine 10 milliGRAM(s) Oral at bedtime  oxybutynin 5 milliGRAM(s) Oral daily  pantoprazole    Tablet 40 milliGRAM(s) Oral before breakfast  polyethylene glycol 3350 17 Gram(s) Oral at bedtime  propranolol 10 milliGRAM(s) Oral daily  senna 2 Tablet(s) Oral at bedtime  sodium chloride 0.9% lock flush 3 milliLiter(s) IV Push every 8 hours  sodium chloride 0.9%. 1000 milliLiter(s) (75 mL/Hr) IV Continuous <Continuous>  tamsulosin 0.4 milliGRAM(s) Oral at bedtime  traMADol 50 milliGRAM(s) Oral every 8 hours    MEDICATIONS  (PRN):  acetaminophen     Tablet .. 650 milliGRAM(s) Oral every 6 hours PRN Temp greater or equal to 38.5C (101.3F)  ketorolac   Injectable 30 milliGRAM(s) IV Push every 6 hours PRN Severe Pain (7 - 10)  magnesium hydroxide Suspension 30 milliLiter(s) Oral daily PRN Constipation  ondansetron Injectable 4 milliGRAM(s) IV Push every 6 hours PRN Nausea and/or Vomiting  oxyCODONE    IR 5 milliGRAM(s) Oral every 4 hours PRN Moderate Pain (4 - 6)    Vital Signs Last 24 Hrs  T(C): 36.7 (15 May 2025 12:16), Max: 38.4 (14 May 2025 20:02)  T(F): 98 (15 May 2025 12:16), Max: 101.2 (14 May 2025 20:02)  HR: 73 (15 May 2025 12:16) (66 - 93)  BP: 128/73 (15 May 2025 12:16) (92/56 - 131/75)  BP(mean): --  RR: 20 (15 May 2025 12:16) (18 - 20)  SpO2: 98% (15 May 2025 12:16) (96% - 99%)    Parameters below as of 15 May 2025 12:16  Patient On (Oxygen Delivery Method): room air    LABS: Reviewed                        9.9    6.24  )-----------( 126      ( 15 May 2025 06:20 )             28.9     05-15    139  |  108  |  8   ----------------------------<  93  3.3[L]   |  24  |  0.63    Ca    8.2[L]      15 May 2025 06:20    Urinalysis Basic - ( 15 May 2025 09:35 )    Color: Yellow / Appearance: Clear / S.004 / pH: x  Gluc: x / Ketone: x  / Bili: Negative / Urobili: 1.0 mg/dL   Blood: x / Protein: Negative mg/dL / Nitrite: Negative   Leuk Esterase: Negative / RBC: x / WBC x   Sq Epi: x / Non Sq Epi: x / Bacteria: x    CAPILLARY BLOOD GLUCOSE    Radiology: Reviewed  ACC: [id number] EXAM:  XR KNEE 1-2 VIEWS RT   ORDERED BY: JSESICA CODY     PROCEDURE DATE:  2025      INTERPRETATION:  Frontal and lateral radiographs of the right knee were   performed following arthroplasty.    There are no prior relevant studies for comparison.    The patient has undergone right knee arthroplasty, with the implant   showing good anatomic alignment and there is no sign of immediate   postprocedure complication. There are typical expected findings in and   around thejoint space in the immediate postoperative period, including   fluid, edema and air. Anterior closure staples are present.    IMPRESSION: Successful right knee arthroplasty with no sign of immediate   postprocedure complication.    --- End of Report ---    ISAAC RAM MD; Attending Radiologist  This document has been electronically signed. May 14 2025  1:30PM    ORT Score -   Family Hx of substance abuse	Female	      Male  Alcohol 	                                           1                     3  Illegal drugs	                                   2                     3  Rx drugs                                           4 	                  4  Personal Hx of substance abuse		  Alcohol 	                                          3	                  3  Illegal drugs                                     4	                  4  Rx drugs                                            5 	                  5  Age between 16- 45 years	           1                     1  hx preadolescent sexual abuse	   3 	                  0  Psychological disease		  ADD, OCD, bipolar, schizophrenia   2	          2  Depression                                           1 	          1  Total: 3    a score of 3 or lower indicates low risk for opioid abuse		  a score of 4-7 indicates moderate risk for opioid abuse		  a score of 8 or higher indicates high risk for opioid abuse    REVIEW OF SYSTEMS:  CONSTITUTIONAL: No fever or fatigue  HEENT:  No difficulty hearing, no change in vision  NECK: No pain or stiffness  RESPIRATORY: No cough, wheezing, chills or hemoptysis; No shortness of breath  CARDIOVASCULAR: No chest pain, palpitations, dizziness, or leg swelling  GASTROINTESTINAL: No loss of appetite, decreased PO intake. No abdominal or epigastric pain. No nausea, vomiting; No diarrhea or constipation.   GENITOURINARY: No dysuria, frequency, hematuria, retention or incontinence  MUSCULOSKELETAL: + right knee pain and swelling; No muscle, back, or extremity pain, no upper or lower motor strength weakness, no saddle anesthesia, bowel/bladder incontinence, no falls   NEURO: No headaches, No numbness/tingling b/l LE, No weakness  ENDOCRINE: No polyuria, polydipsia, heat or cold intolerance; No hair loss  PSYCHIATRIC: Hx of depression, anxiety, Schizoaffective disorder, no difficulty sleeping    PHYSICAL EXAM:  GENERAL:  Alert & Oriented X4, cooperative, NAD, Good concentration. Speech is clear.   RESPIRATORY: Respirations even and unlabored. Clear to auscultation bilaterally; No rales, rhonchi, wheezing, or rubs  CARDIOVASCULAR: Normal S1/S2, regular rate and rhythm; No murmurs, rubs, or gallops. No JVD.   GASTROINTESTINAL:  Soft, Nontender, Nondistended; Bowel sounds present  PERIPHERAL VASCULAR:  Extremities warm without edema. 2+ Peripheral Pulses, No cyanosis, No calf tenderness  MUSCULOSKELETAL: Motor Strength 5/5 B/L upper and left lower extremities; moves all extremities equally against gravity; ROM decreased to RLE due to surgical pain, + tenderness on palpation of right knee  SKIN: Warm, dry, intact. No rashes, lesions, scars or wounds. Right knee with ace wrap dressing in place, CDI    Risk factors associated with adverse outcomes related to opioid treatment  [ ]  Concurrent benzodiazepine use  [ ]  History/ Active substance use or alcohol use disorder  [x] Psychiatric co-morbidity  [ ] Sleep apnea  [ ] COPD  [ ] BMI> 35  [ ] Liver dysfunction  [ ] Renal dysfunction  [ ] CHF  [ ] Smoker  [ ]  Age > 60 years    [x]  [facility]  Reviewed and Copied to Chart. See below.    Plan of care and goal oriented pain management treatment options were discussed with patient and /or primary care giver; all questions and concerns were addressed and care was aligned with patient's wishes.    Educated patient on goal oriented pain management treatment options     05-15-25 @ 12:18     Source of information: ALVARO CHRISTENSEN, Chart review  Patient language: English  : n/a    HPI:  55 y/o male, from [facility], with PMH of Asthma, Schizophrenia, Osteoarthritis, BPH, Urinary Incontinence.   No PSH.  Pt c/o progressively worsening right knee pain for the past 7 years.  Pt presents to pre-surgical evaluation now scheduled for Right Total Knee Replacement on 25 with Dr. Galarza.  (26 Mar 2025 13:49)    Pt is admitted for scheduled right knee surgery with MD Galarza. Pain consulted for post operative right knee pain. Pt is s/p Right Total Knee Replacement on . POD#2. Pt seen and examined at bedside this morning. Pt found OOB ambulating to bed from bathroom using walker with tolerable pain, no distress noted or facial grimacing observed. Pt A&Ox3, speech clear. Reports pain score 8/10 on right knee at present time, tolerable. SCALE USED: (1-10 VNRS). Pt describes pain as aching, and radiating to right leg, alleviated by pain medication and exacerbated by movement and ambulation. Pt tolerating PO diet. Denies lethargy, nausea, vomiting, constipation, itchiness. Reports last BM today 5/15, but reports "very small". Patient stated goal for pain control: to be able to take deep breaths, get out of bed to chair and ambulate with tolerable pain control. Pt reports taking Tylenol for pain at home and baseline independently. Pt instructed to use PRN pain meds as needed for pain control. Plan MARIVEL CHING.    PAST MEDICAL & SURGICAL HISTORY:  Anxiety    BPH (benign prostatic hyperplasia)    Schizoaffective disorder    Depression    Psychosis    No significant past surgical history    FAMILY HISTORY:    Social History:   [x]Denies ETOH use, illicit drug use, and smoking     Allergies    butyrophenones (Unknown)  phenothiazines (Unknown)  thioxanthenes (Unknown)  Haldol (Unknown)    Intolerances    MEDICATIONS  (STANDING):  celecoxib 200 milliGRAM(s) Oral every 24 hours  dicyclomine 10 milliGRAM(s) Oral three times a day before meals  enoxaparin Injectable 30 milliGRAM(s) SubCutaneous every 12 hours  ferrous    sulfate 325 milliGRAM(s) Oral daily  OLANZapine 10 milliGRAM(s) Oral at bedtime  oxybutynin 5 milliGRAM(s) Oral daily  pantoprazole    Tablet 40 milliGRAM(s) Oral before breakfast  polyethylene glycol 3350 17 Gram(s) Oral at bedtime  propranolol 10 milliGRAM(s) Oral daily  senna 2 Tablet(s) Oral at bedtime  sodium chloride 0.9% lock flush 3 milliLiter(s) IV Push every 8 hours  sodium chloride 0.9%. 1000 milliLiter(s) (75 mL/Hr) IV Continuous <Continuous>  tamsulosin 0.4 milliGRAM(s) Oral at bedtime  traMADol 50 milliGRAM(s) Oral every 8 hours    MEDICATIONS  (PRN):  acetaminophen     Tablet .. 650 milliGRAM(s) Oral every 6 hours PRN Temp greater or equal to 38.5C (101.3F)  ketorolac   Injectable 30 milliGRAM(s) IV Push every 6 hours PRN Severe Pain (7 - 10)  magnesium hydroxide Suspension 30 milliLiter(s) Oral daily PRN Constipation  ondansetron Injectable 4 milliGRAM(s) IV Push every 6 hours PRN Nausea and/or Vomiting  oxyCODONE    IR 5 milliGRAM(s) Oral every 4 hours PRN Moderate Pain (4 - 6)    Vital Signs Last 24 Hrs  T(C): 36.7 (15 May 2025 12:16), Max: 38.4 (14 May 2025 20:02)  T(F): 98 (15 May 2025 12:16), Max: 101.2 (14 May 2025 20:02)  HR: 73 (15 May 2025 12:16) (66 - 93)  BP: 128/73 (15 May 2025 12:16) (92/56 - 131/75)  BP(mean): --  RR: 20 (15 May 2025 12:16) (18 - 20)  SpO2: 98% (15 May 2025 12:16) (96% - 99%)    Parameters below as of 15 May 2025 12:16  Patient On (Oxygen Delivery Method): room air    LABS: Reviewed                        9.9    6.24  )-----------( 126      ( 15 May 2025 06:20 )             28.9     05-15    139  |  108  |  8   ----------------------------<  93  3.3[L]   |  24  |  0.63    Ca    8.2[L]      15 May 2025 06:20    Urinalysis Basic - ( 15 May 2025 09:35 )    Color: Yellow / Appearance: Clear / S.004 / pH: x  Gluc: x / Ketone: x  / Bili: Negative / Urobili: 1.0 mg/dL   Blood: x / Protein: Negative mg/dL / Nitrite: Negative   Leuk Esterase: Negative / RBC: x / WBC x   Sq Epi: x / Non Sq Epi: x / Bacteria: x    CAPILLARY BLOOD GLUCOSE    Radiology: Reviewed  ACC: [id number] EXAM:  XR KNEE 1-2 VIEWS RT   ORDERED BY: JESSICA CODY     PROCEDURE DATE:  2025      INTERPRETATION:  Frontal and lateral radiographs of the right knee were   performed following arthroplasty.    There are no prior relevant studies for comparison.    The patient has undergone right knee arthroplasty, with the implant   showing good anatomic alignment and there is no sign of immediate   postprocedure complication. There are typical expected findings in and   around thejoint space in the immediate postoperative period, including   fluid, edema and air. Anterior closure staples are present.    IMPRESSION: Successful right knee arthroplasty with no sign of immediate   postprocedure complication.    --- End of Report ---    ISAAC RAM MD; Attending Radiologist  This document has been electronically signed. May 14 2025  1:30PM    ORT Score -   Family Hx of substance abuse	Female	      Male  Alcohol 	                                           1                     3  Illegal drugs	                                   2                     3  Rx drugs                                           4 	                  4  Personal Hx of substance abuse		  Alcohol 	                                          3	                  3  Illegal drugs                                     4	                  4  Rx drugs                                            5 	                  5  Age between 16- 45 years	           1                     1  hx preadolescent sexual abuse	   3 	                  0  Psychological disease		  ADD, OCD, bipolar, schizophrenia   2	          2  Depression                                           1 	          1  Total: 3    a score of 3 or lower indicates low risk for opioid abuse		  a score of 4-7 indicates moderate risk for opioid abuse		  a score of 8 or higher indicates high risk for opioid abuse    REVIEW OF SYSTEMS:  CONSTITUTIONAL: No fever or fatigue  HEENT:  No difficulty hearing, no change in vision  NECK: No pain or stiffness  RESPIRATORY: No cough, wheezing, chills or hemoptysis; No shortness of breath  CARDIOVASCULAR: No chest pain, palpitations, dizziness, or leg swelling  GASTROINTESTINAL: No loss of appetite, decreased PO intake. No abdominal or epigastric pain. No nausea, vomiting; No diarrhea or constipation.   GENITOURINARY: No dysuria, frequency, hematuria, retention or incontinence  MUSCULOSKELETAL: + right knee pain and swelling; No muscle, back, or extremity pain, no upper or lower motor strength weakness, no saddle anesthesia, bowel/bladder incontinence, no falls   NEURO: No headaches, No numbness/tingling b/l LE, No weakness  ENDOCRINE: No polyuria, polydipsia, heat or cold intolerance; No hair loss  PSYCHIATRIC: Hx of depression, anxiety, Schizoaffective disorder, no difficulty sleeping    PHYSICAL EXAM:  GENERAL:  Alert & Oriented X4, cooperative, NAD, Good concentration. Speech is clear.   RESPIRATORY: Respirations even and unlabored. Clear to auscultation bilaterally; No rales, rhonchi, wheezing, or rubs  CARDIOVASCULAR: Normal S1/S2, regular rate and rhythm; No murmurs, rubs, or gallops. No JVD.   GASTROINTESTINAL:  Soft, Nontender, Nondistended; Bowel sounds present  PERIPHERAL VASCULAR:  Extremities warm without edema. 2+ Peripheral Pulses, No cyanosis, No calf tenderness  MUSCULOSKELETAL: Motor Strength 5/5 B/L upper and left lower extremities; moves all extremities equally against gravity; ROM decreased to RLE due to surgical pain, + tenderness on palpation of right knee  SKIN: Warm, dry, intact. No rashes, lesions, scars or wounds. Right knee with ace wrap dressing in place, CDI    Risk factors associated with adverse outcomes related to opioid treatment  [ ]  Concurrent benzodiazepine use  [ ]  History/ Active substance use or alcohol use disorder  [x] Psychiatric co-morbidity  [ ] Sleep apnea  [ ] COPD  [ ] BMI> 35  [ ] Liver dysfunction  [ ] Renal dysfunction  [ ] CHF  [ ] Smoker  [ ]  Age > 60 years    [x]  [facility]  Reviewed and Copied to Chart. See below.    Plan of care and goal oriented pain management treatment options were discussed with patient and /or primary care giver; all questions and concerns were addressed and care was aligned with patient's wishes.    Educated patient on goal oriented pain management treatment options     05-15-25 @ 12:18

## 2025-05-15 NOTE — PROGRESS NOTE ADULT - SUBJECTIVE AND OBJECTIVE BOX
[id number]    Diagnosis: S/p Right Total Knee Replacement POD#2    Patient was seen and evaluated at bedside.  Patient c/o R knee and thigh pain. Denies radiation of pain. Patient states the pain is mainly with movement. Pain is well controlled.    Patient admits to being OOB with PT yesterday.   Patient had fever overnight of 101.2F but denies fever, chills, rigors this morning. Denies cough, congestion, SOB.   Pt denies Fever, Chest pain, SOB, dyspnea, paresthesias, N/V/D, abdominal pain, syncope, or pain anywhere else.     Vital Signs Last 24 Hrs  T(C): 36.8 (15 May 2025 05:11), Max: 38.4 (14 May 2025 20:02)  T(F): 98.2 (15 May 2025 05:11), Max: 101.2 (14 May 2025 20:02)  HR: 67 (15 May 2025 05:11) (66 - 87)  BP: 128/73 (15 May 2025 05:11) (92/56 - 131/75)  BP(mean): --  RR: 18 (15 May 2025 05:11) (18 - 18)  SpO2: 99% (15 May 2025 05:11) (96% - 99%)    Parameters below as of 15 May 2025 05:11  Patient On (Oxygen Delivery Method): room air        05-14-25 @ 07:01  -  05-15-25 @ 07:00  --------------------------------------------------------  IN: 0 mL / OUT: 950 mL / NET: -950 mL        Physical Exam:  General: AAOx3, NAD, resting comfortably in bed.  Right knee:  Dressing is C/D/I. Skin is pink and warm. SILT.  No drainage.   Lower extremities:  No calf tenderness, calves are soft. 2+pulses. NVI. 5/5 Strength of EHL/FHL/ADF/APF.  Good capillary refill. SILT.                          9.9    6.24  )-----------( 126      ( 15 May 2025 06:20 )             28.9     05-15    139  |  108  |  8   ----------------------------<  93  3.3[L]   |  24  |  0.63    Ca    8.2[L]      15 May 2025 06:20        Impression:  54yMale S/p Right Total Knee Replacement POD#2, Hypokalemia     Plan:  -  Fever of 101.2F last night at 10pm     > CXR performed, UA ordered    > Patient is asymptomatic, will continue to monitor until afebrile for 24 hours.  -  K 3.3 this morning, potassium chloride 20mEQ ordered.   -  Pain management  -  DVT prophylaxis with Lovenox and venodynes  -  Daily Physical Therapy:  WBAT of the Right lower extremity with appropriate assistive device   -  Heel bump to RLE while lying in bed  -  Discharge planning: back to [facility] when afebrile for 24 hours  -  Incentive spirometry encouraged.   -  Case d/w Dr. Galarza

## 2025-05-15 NOTE — PROGRESS NOTE ADULT - ASSESSMENT
Confidential Drug Utilization Report  Search Terms: Shun Cesar, 1970Search Date: 05/14/2025 12:12:47 PM  The Drug Utilization Report below displays all of the controlled substance prescriptions, if any, that your patient has filled in the last twelve months. The information displayed on this report is compiled from pharmacy submissions to the Department, and accurately reflects the information as submitted by the pharmacies.    This report was requested by: Caprice Moya | Reference #: [id number]    You have not added a NAMAN number. Keeping your NAMAN number(s) up to date on the My NAMAN # tab will enable the separation of your prescriptions from others in the search results.    Practitioner Count: 1  Pharmacy Count: 1  Current Opioid Prescriptions: 0  Current Benzodiazepine Prescriptions: 0  Current Stimulant Prescriptions: 0      Patient Demographic Information (PDI)       PDI	First Name	Last Name	Birth Date	Gender	Street Address	[facility]	Zip Code  A	Shun Cesar	1970	Male	[address]	[facility]	[address]    Prescription Information      PDI Filter:    PDI	Current Rx	Drug Type	Rx Written	Rx Dispensed	Drug	Quantity	Days Supply  A	N	O	03/25/2025	03/28/2025	oxycodone-acetaminophen 7.5-325 mg tablet	28	7  Prescriber Name Baltazar Galarza MD  Prescriber NAMAN # [id number]  Payment Method Medicaid  Dispenser [facility]    * - Details of Drug Type : O = Opioid, B = Benzodiazepine, S = Stimulant    * - Drugs marked with an asterisk are compound drugs. If the compound drug is made up of more than one controlled substance, then each controlled substance will be a separate row in the table.

## 2025-05-16 VITALS — SYSTOLIC BLOOD PRESSURE: 117 MMHG | DIASTOLIC BLOOD PRESSURE: 54 MMHG | OXYGEN SATURATION: 96 % | HEART RATE: 84 BPM

## 2025-05-16 LAB
ANION GAP SERPL CALC-SCNC: 7 MMOL/L — SIGNIFICANT CHANGE UP (ref 5–17)
BUN SERPL-MCNC: 7 MG/DL — SIGNIFICANT CHANGE UP (ref 7–18)
CALCIUM SERPL-MCNC: 8.4 MG/DL — SIGNIFICANT CHANGE UP (ref 8.4–10.5)
CHLORIDE SERPL-SCNC: 106 MMOL/L — SIGNIFICANT CHANGE UP (ref 96–108)
CO2 SERPL-SCNC: 25 MMOL/L — SIGNIFICANT CHANGE UP (ref 22–31)
CREAT SERPL-MCNC: 0.59 MG/DL — SIGNIFICANT CHANGE UP (ref 0.5–1.3)
EGFR: 115 ML/MIN/1.73M2 — SIGNIFICANT CHANGE UP
EGFR: 115 ML/MIN/1.73M2 — SIGNIFICANT CHANGE UP
GLUCOSE SERPL-MCNC: 101 MG/DL — HIGH (ref 70–99)
HCT VFR BLD CALC: 27.3 % — LOW (ref 39–50)
HGB BLD-MCNC: 9.4 G/DL — LOW (ref 13–17)
MCHC RBC-ENTMCNC: 30 PG — SIGNIFICANT CHANGE UP (ref 27–34)
MCHC RBC-ENTMCNC: 34.4 G/DL — SIGNIFICANT CHANGE UP (ref 32–36)
MCV RBC AUTO: 87.2 FL — SIGNIFICANT CHANGE UP (ref 80–100)
NRBC BLD AUTO-RTO: 0 /100 WBCS — SIGNIFICANT CHANGE UP (ref 0–0)
PLATELET # BLD AUTO: 139 K/UL — LOW (ref 150–400)
POTASSIUM SERPL-MCNC: 3.3 MMOL/L — LOW (ref 3.5–5.3)
POTASSIUM SERPL-SCNC: 3.3 MMOL/L — LOW (ref 3.5–5.3)
RBC # BLD: 3.13 M/UL — LOW (ref 4.2–5.8)
RBC # FLD: 13.5 % — SIGNIFICANT CHANGE UP (ref 10.3–14.5)
SODIUM SERPL-SCNC: 138 MMOL/L — SIGNIFICANT CHANGE UP (ref 135–145)
WBC # BLD: 5.55 K/UL — SIGNIFICANT CHANGE UP (ref 3.8–10.5)
WBC # FLD AUTO: 5.55 K/UL — SIGNIFICANT CHANGE UP (ref 3.8–10.5)

## 2025-05-16 PROCEDURE — 83036 HEMOGLOBIN GLYCOSYLATED A1C: CPT

## 2025-05-16 PROCEDURE — C1776: CPT

## 2025-05-16 PROCEDURE — 88305 TISSUE EXAM BY PATHOLOGIST: CPT

## 2025-05-16 PROCEDURE — 86850 RBC ANTIBODY SCREEN: CPT

## 2025-05-16 PROCEDURE — 97116 GAIT TRAINING THERAPY: CPT

## 2025-05-16 PROCEDURE — 97110 THERAPEUTIC EXERCISES: CPT

## 2025-05-16 PROCEDURE — 85027 COMPLETE CBC AUTOMATED: CPT

## 2025-05-16 PROCEDURE — 86900 BLOOD TYPING SEROLOGIC ABO: CPT

## 2025-05-16 PROCEDURE — 73560 X-RAY EXAM OF KNEE 1 OR 2: CPT

## 2025-05-16 PROCEDURE — 82962 GLUCOSE BLOOD TEST: CPT

## 2025-05-16 PROCEDURE — 97162 PT EVAL MOD COMPLEX 30 MIN: CPT

## 2025-05-16 PROCEDURE — 80048 BASIC METABOLIC PNL TOTAL CA: CPT

## 2025-05-16 PROCEDURE — 97165 OT EVAL LOW COMPLEX 30 MIN: CPT

## 2025-05-16 PROCEDURE — 81003 URINALYSIS AUTO W/O SCOPE: CPT

## 2025-05-16 PROCEDURE — 71045 X-RAY EXAM CHEST 1 VIEW: CPT

## 2025-05-16 PROCEDURE — C1713: CPT

## 2025-05-16 PROCEDURE — 36415 COLL VENOUS BLD VENIPUNCTURE: CPT

## 2025-05-16 PROCEDURE — 86901 BLOOD TYPING SEROLOGIC RH(D): CPT

## 2025-05-16 PROCEDURE — 88311 DECALCIFY TISSUE: CPT

## 2025-05-16 PROCEDURE — 99232 SBSQ HOSP IP/OBS MODERATE 35: CPT

## 2025-05-16 RX ORDER — OXYBUTYNIN CHLORIDE 5 MG/1
5 TABLET, FILM COATED, EXTENDED RELEASE ORAL DAILY
Refills: 0 | Status: DISCONTINUED | OUTPATIENT
Start: 2025-05-16 | End: 2025-05-16

## 2025-05-16 RX ADMIN — OXYBUTYNIN CHLORIDE 5 MILLIGRAM(S): 5 TABLET, FILM COATED, EXTENDED RELEASE ORAL at 10:46

## 2025-05-16 RX ADMIN — Medication 40 MILLIGRAM(S): at 06:19

## 2025-05-16 RX ADMIN — TRAMADOL HYDROCHLORIDE 50 MILLIGRAM(S): 50 TABLET, FILM COATED ORAL at 06:19

## 2025-05-16 RX ADMIN — OXYCODONE HYDROCHLORIDE 5 MILLIGRAM(S): 30 TABLET ORAL at 07:23

## 2025-05-16 RX ADMIN — TRAMADOL HYDROCHLORIDE 50 MILLIGRAM(S): 50 TABLET, FILM COATED ORAL at 03:15

## 2025-05-16 RX ADMIN — OXYCODONE HYDROCHLORIDE 5 MILLIGRAM(S): 30 TABLET ORAL at 06:19

## 2025-05-16 RX ADMIN — Medication 10 MILLIGRAM(S): at 06:18

## 2025-05-16 RX ADMIN — TRAMADOL HYDROCHLORIDE 50 MILLIGRAM(S): 50 TABLET, FILM COATED ORAL at 07:23

## 2025-05-16 RX ADMIN — Medication 10 MILLIGRAM(S): at 10:46

## 2025-05-16 RX ADMIN — ENOXAPARIN SODIUM 30 MILLIGRAM(S): 100 INJECTION SUBCUTANEOUS at 06:19

## 2025-05-16 RX ADMIN — Medication 20 MILLIEQUIVALENT(S): at 10:46

## 2025-05-16 RX ADMIN — Medication 3 MILLILITER(S): at 06:42

## 2025-05-16 NOTE — PROGRESS NOTE ADULT - SUBJECTIVE AND OBJECTIVE BOX
[id number]    Diagnosis: S/p Right Total Knee Replacement POD#3    Patient was seen and evaluated at bedside. Patient with no acute complaints. Patient has been afebrile for over 24 hours.   Pain is moderate to the right knee and right anterior thigh. Denies numbness/tingling.  Patient has been OOB with PT.  Denies CP/SOB, dyspnea, paresthesias, N/V/D, palpitations.     Vital Signs Last 24 Hrs  T(C): 37.4 (16 May 2025 05:47), Max: 37.4 (16 May 2025 05:47)  T(F): 99.4 (16 May 2025 05:47), Max: 99.4 (16 May 2025 05:47)  HR: 95 (16 May 2025 05:47) (73 - 95)  BP: 104/68 (16 May 2025 05:47) (104/68 - 128/73)  BP(mean): 92 (15 May 2025 22:00) (92 - 92)  RR: 18 (16 May 2025 05:47) (16 - 20)  SpO2: 95% (16 May 2025 05:47) (95% - 100%)    Parameters below as of 16 May 2025 05:47  Patient On (Oxygen Delivery Method): room air          Physical Exam:  General: AAOx3, NAD, resting comfortably in bed.  Right knee:  Dressing is C/D/I. Skin is pink and warm. SILT.  No drainage. Changed today with Mepilex   Lower extremities:  No calf tenderness, calves are soft. 2+pulses. NVI. 5/5 Strength of EHL/FHL/ADF/APF.  Good capillary refill. SILT.                          9.4    5.55  )-----------( 139      ( 16 May 2025 06:25 )             27.3     05-16    138  |  106  |  7   ----------------------------<  101[H]  3.3[L]   |  25  |  0.59    Ca    8.4      16 May 2025 06:25        Impression:  54yMale S/p Right Total Knee Replacement POD#3, hypokalemia  Plan:  -  20 mg PO potassium ordered  -  Afebrile for 20 hours: UA and chest x-ray within normal limits.   -  Pain management  -  DVT prophylaxis with Lovenox and venodynes  -  Daily Physical Therapy:  WBAT of the Right lower extremity with appropriate assistive device   -  Heel bump to RLE while lying in bed  -  Discharge planning: Home today   -  Incentive spirometry encouraged.   -  Case d/w Dr. Galarza

## 2025-05-16 NOTE — PROGRESS NOTE ADULT - ASSESSMENT
Confidential Drug Utilization Report  Search Terms: Shun Cesar, 1970Search Date: 05/14/2025 12:12:47 PM  The Drug Utilization Report below displays all of the controlled substance prescriptions, if any, that your patient has filled in the last twelve months. The information displayed on this report is compiled from pharmacy submissions to the Department, and accurately reflects the information as submitted by the pharmacies.    This report was requested by: Caprice Moya | Reference #: [id number]    You have not added a NAMAN number. Keeping your NAMAN number(s) up to date on the My NAMAN # tab will enable the separation of your prescriptions from others in the search results.    Practitioner Count: 1  Pharmacy Count: 1  Current Opioid Prescriptions: 0  Current Benzodiazepine Prescriptions: 0  Current Stimulant Prescriptions: 0      Patient Demographic Information (PDI)       PDI	First Name	Last Name	Birth Date	Gender	Street Address	[facility]	Zip Code  A	Shun Cesar	1970	Male	[address]	[facility]	[address]    Prescription Information      PDI Filter:    PDI	Current Rx	Drug Type	Rx Written	Rx Dispensed	Drug	Quantity	Days Supply  A	N	O	03/25/2025	03/28/2025	oxycodone-acetaminophen 7.5-325 mg tablet	28	7  Prescriber Name Baltazar Galarza MD  Prescriber NAMAN # [id number]  Payment Method Medicaid  Dispenser [facility]    * - Details of Drug Type : O = Opioid, B = Benzodiazepine, S = Stimulant    * - Drugs marked with an asterisk are compound drugs. If the compound drug is made up of more than one controlled substance, then each controlled substance will be a separate row in the table.

## 2025-05-16 NOTE — PROGRESS NOTE ADULT - SUBJECTIVE AND OBJECTIVE BOX
Source of information: ALVARO CHRISTENSEN, Chart review  Patient language: English  : n/a    HPI:  55 y/o male, from [facility], with PMH of Asthma, Schizophrenia, Osteoarthritis, BPH, Urinary Incontinence.   No PSH.  Pt c/o progressively worsening right knee pain for the past 7 years.  Pt presents to pre-surgical evaluation now scheduled for Right Total Knee Replacement on 4/1/25 with Dr. Galarza.  (26 Mar 2025 13:49)    Pt is admitted for scheduled right knee surgery with MD Galarza. Pain consulted for post operative right knee pain. Pt is s/p Right Total Knee Replacement on 5/13. POD#3. Pt seen and examined at bedside this morning. Pt found OOB ambulating to bed from bathroom using walker with tolerable pain, no distress noted or facial grimacing observed. Pt A&Ox3, speech clear. Reports pain score 8/10 on right knee at present time, tolerable. SCALE USED: (1-10 VNRS). Pt describes pain as aching, and radiating to right leg, alleviated by pain medication and exacerbated by movement and ambulation. Pt tolerating PO diet. Denies lethargy, nausea, vomiting, constipation, itchiness. Reports last BM Monday today 5/15, but reports "very small". Patient stated goal for pain control: to be able to take deep breaths, get out of bed to chair and ambulate with tolerable pain control. Pt reports taking Tylenol for pain at home and baseline independently. Pt instructed to use PRN pain meds as needed for pain control. Plan DC [facility] vs Home.    PAST MEDICAL & SURGICAL HISTORY:  Anxiety    BPH (benign prostatic hyperplasia)    Schizoaffective disorder    Depression    Psychosis    No significant past surgical history    FAMILY HISTORY:    Social History:   [x]Denies ETOH use, illicit drug use, and smoking     Allergies    butyrophenones (Unknown)  phenothiazines (Unknown)  thioxanthenes (Unknown)  Haldol (Unknown)    Intolerances    MEDICATIONS  (STANDING):  celecoxib 200 milliGRAM(s) Oral every 24 hours  dicyclomine 10 milliGRAM(s) Oral three times a day before meals  enoxaparin Injectable 30 milliGRAM(s) SubCutaneous every 12 hours  ferrous    sulfate 325 milliGRAM(s) Oral daily  OLANZapine 10 milliGRAM(s) Oral at bedtime  oxybutynin XL 5 milliGRAM(s) Oral daily  pantoprazole    Tablet 40 milliGRAM(s) Oral before breakfast  polyethylene glycol 3350 17 Gram(s) Oral at bedtime  propranolol 10 milliGRAM(s) Oral daily  senna 2 Tablet(s) Oral at bedtime  sodium chloride 0.9% lock flush 3 milliLiter(s) IV Push every 8 hours  sodium chloride 0.9%. 1000 milliLiter(s) (75 mL/Hr) IV Continuous <Continuous>  tamsulosin 0.4 milliGRAM(s) Oral at bedtime  traMADol 50 milliGRAM(s) Oral every 8 hours    MEDICATIONS  (PRN):  acetaminophen     Tablet .. 650 milliGRAM(s) Oral every 6 hours PRN Temp greater or equal to 38.5C (101.3F)  ketorolac   Injectable 30 milliGRAM(s) IV Push every 6 hours PRN Severe Pain (7 - 10)  magnesium hydroxide Suspension 30 milliLiter(s) Oral daily PRN Constipation  ondansetron Injectable 4 milliGRAM(s) IV Push every 6 hours PRN Nausea and/or Vomiting  oxyCODONE    IR 5 milliGRAM(s) Oral every 4 hours PRN Moderate Pain (4 - 6)    Vital Signs Last 24 Hrs  T(C): 37.1 (16 May 2025 12:15), Max: 37.4 (16 May 2025 05:47)  T(F): 98.7 (16 May 2025 12:15), Max: 99.4 (16 May 2025 05:47)  HR: 75 (16 May 2025 12:15) (75 - 95)  BP: 106/67 (16 May 2025 12:15) (104/68 - 127/74)  BP(mean): 80 (16 May 2025 12:15) (80 - 92)  RR: 17 (16 May 2025 12:15) (16 - 18)  SpO2: 95% (16 May 2025 12:15) (95% - 100%)    Parameters below as of 16 May 2025 12:15  Patient On (Oxygen Delivery Method): room air    LABS: Reviewed                        9.4    5.55  )-----------( 139      ( 16 May 2025 06:25 )             27.3     05-16    138  |  106  |  7   ----------------------------<  101[H]  3.3[L]   |  25  |  0.59    Ca    8.4      16 May 2025 06:25    Urinalysis Basic - ( 16 May 2025 06:25 )    Color: x / Appearance: x / SG: x / pH: x  Gluc: 101 mg/dL / Ketone: x  / Bili: x / Urobili: x   Blood: x / Protein: x / Nitrite: x   Leuk Esterase: x / RBC: x / WBC x   Sq Epi: x / Non Sq Epi: x / Bacteria: x    CAPILLARY BLOOD GLUCOSE    Radiology: Reviewed  ACC: [id number] EXAM:  XR KNEE 1-2 VIEWS RT   ORDERED BY: JESSICA CODY     PROCEDURE DATE:  05/13/2025      INTERPRETATION:  Frontal and lateral radiographs of the right knee were   performed following arthroplasty.    There are no prior relevant studies for comparison.    The patient has undergone right knee arthroplasty, with the implant   showing good anatomic alignment and there is no sign of immediate   postprocedure complication. There are typical expected findings in and   around thejoint space in the immediate postoperative period, including   fluid, edema and air. Anterior closure staples are present.    IMPRESSION: Successful right knee arthroplasty with no sign of immediate   postprocedure complication.    --- End of Report ---    ISAAC RAM MD; Attending Radiologist  This document has been electronically signed. May 14 2025  1:30PM  ACC: [id number] EXAM:  XR CHEST PORTABLE ROUTINE 1V   ORDERED BY: GRETA MAYBERRY     PROCEDURE DATE:  05/14/2025      INTERPRETATION:  Portable AP chest radiograph    COMPARISON:  NONE.    CLINICAL INFORMATION: Postop fever.    FINDINGS:  CATHETERS AND TUBES: None    PULMONARY:  No airspace consolidations or radiographic evidence of pulmonary nodules..  No pleural effusion or pneumothorax.    HEART/VASCULAR: The heart size and mediastinum configuration are within   the limits of normal.    BONES: The visualized osseous thorax is intact.    IMPRESSION:   No radiographic evidence of active chest disease..    --- End of Report ---    FRANCISCO RODRIGUES MD; Attending Radiologist  This document has been electronically signed. May 15 2025 3:18PM    ORT Score -   Family Hx of substance abuse	Female	      Male  Alcohol 	                                           1                     3  Illegal drugs	                                   2                     3  Rx drugs                                           4 	                  4  Personal Hx of substance abuse		  Alcohol 	                                          3	                  3  Illegal drugs                                     4	                  4  Rx drugs                                            5 	                  5  Age between 16- 45 years	           1                     1  hx preadolescent sexual abuse	   3 	                  0  Psychological disease		  ADD, OCD, bipolar, schizophrenia   2	          2  Depression                                           1 	          1  Total: 3    a score of 3 or lower indicates low risk for opioid abuse		  a score of 4-7 indicates moderate risk for opioid abuse		  a score of 8 or higher indicates high risk for opioid abuse    REVIEW OF SYSTEMS:  CONSTITUTIONAL: No fever or fatigue  HEENT:  No difficulty hearing, no change in vision  NECK: No pain or stiffness  RESPIRATORY: No cough, wheezing, chills or hemoptysis; No shortness of breath  CARDIOVASCULAR: No chest pain, palpitations, dizziness, or leg swelling  GASTROINTESTINAL: No loss of appetite, decreased PO intake. No abdominal or epigastric pain. No nausea, vomiting; No diarrhea or constipation.   GENITOURINARY: No dysuria, frequency, hematuria, retention or incontinence  MUSCULOSKELETAL: + right knee pain and swelling; No muscle, back, or extremity pain, no upper or lower motor strength weakness, no saddle anesthesia, bowel/bladder incontinence, no falls   NEURO: No headaches, No numbness/tingling b/l LE, No weakness  ENDOCRINE: No polyuria, polydipsia, heat or cold intolerance; No hair loss  PSYCHIATRIC: Hx of depression, anxiety, Schizoaffective disorder, no difficulty sleeping    PHYSICAL EXAM:  GENERAL:  Alert & Oriented X4, cooperative, NAD, Good concentration. Speech is clear.   RESPIRATORY: Respirations even and unlabored. Clear to auscultation bilaterally; No rales, rhonchi, wheezing, or rubs  CARDIOVASCULAR: Normal S1/S2, regular rate and rhythm; No murmurs, rubs, or gallops. No JVD.   GASTROINTESTINAL:  Soft, Nontender, Nondistended; Bowel sounds present  PERIPHERAL VASCULAR:  Extremities warm without edema. 2+ Peripheral Pulses, No cyanosis, No calf tenderness  MUSCULOSKELETAL: Motor Strength 5/5 B/L upper and left lower extremities; moves all extremities equally against gravity; ROM decreased to RLE due to surgical pain, + tenderness on palpation of right knee  SKIN: Warm, dry, intact. No rashes, lesions, scars or wounds. Right knee with ace wrap dressing in place, CDI, + ecchymosis noted around right knee area.    Risk factors associated with adverse outcomes related to opioid treatment  [ ]  Concurrent benzodiazepine use  [ ]  History/ Active substance use or alcohol use disorder  [x] Psychiatric co-morbidity  [ ] Sleep apnea  [ ] COPD  [ ] BMI> 35  [ ] Liver dysfunction  [ ] Renal dysfunction  [ ] CHF  [ ] Smoker  [ ]  Age > 60 years    [x]  [place]  Reviewed and Copied to Chart. See below.    Plan of care and goal oriented pain management treatment options were discussed with patient and /or primary care giver; all questions and concerns were addressed and care was aligned with patient's wishes.    Educated patient on goal oriented pain management treatment options     05-16-25 @ 12:37     Source of information: ALVARO CHRISTENSEN, Chart review  Patient language: English  : n/a    HPI:  55 y/o male, from [facility], with PMH of Asthma, Schizophrenia, Osteoarthritis, BPH, Urinary Incontinence.   No PSH.  Pt c/o progressively worsening right knee pain for the past 7 years.  Pt presents to pre-surgical evaluation now scheduled for Right Total Knee Replacement on 4/1/25 with Dr. Galarza.  (26 Mar 2025 13:49)    Pt is admitted for scheduled right knee surgery with MD Galarza. Pain consulted for post operative right knee pain. Pt is s/p Right Total Knee Replacement on 5/13. POD#3. Pt seen and examined at bedside this morning. Pt found OOB ambulating to bed from bathroom using walker with tolerable pain, no distress noted or facial grimacing observed. Pt A&Ox3, speech clear. Reports pain score 8/10 on right knee at present time, tolerable. SCALE USED: (1-10 VNRS). Pt describes pain as aching, and radiating to right leg, alleviated by pain medication and exacerbated by movement and ambulation. Pt tolerating PO diet. Denies lethargy, nausea, vomiting, constipation, itchiness. Reports last BM yesterday 5/15, but reports "very small". Patient stated goal for pain control: to be able to take deep breaths, get out of bed to chair and ambulate with tolerable pain control. Pt reports taking Tylenol for pain at home and baseline independently. Pt instructed to use PRN pain meds as needed for pain control. Plan DC [facility] vs Home.    PAST MEDICAL & SURGICAL HISTORY:  Anxiety    BPH (benign prostatic hyperplasia)    Schizoaffective disorder    Depression    Psychosis    No significant past surgical history    FAMILY HISTORY:    Social History:   [x]Denies ETOH use, illicit drug use, and smoking     Allergies    butyrophenones (Unknown)  phenothiazines (Unknown)  thioxanthenes (Unknown)  Haldol (Unknown)    Intolerances    MEDICATIONS  (STANDING):  celecoxib 200 milliGRAM(s) Oral every 24 hours  dicyclomine 10 milliGRAM(s) Oral three times a day before meals  enoxaparin Injectable 30 milliGRAM(s) SubCutaneous every 12 hours  ferrous    sulfate 325 milliGRAM(s) Oral daily  OLANZapine 10 milliGRAM(s) Oral at bedtime  oxybutynin XL 5 milliGRAM(s) Oral daily  pantoprazole    Tablet 40 milliGRAM(s) Oral before breakfast  polyethylene glycol 3350 17 Gram(s) Oral at bedtime  propranolol 10 milliGRAM(s) Oral daily  senna 2 Tablet(s) Oral at bedtime  sodium chloride 0.9% lock flush 3 milliLiter(s) IV Push every 8 hours  sodium chloride 0.9%. 1000 milliLiter(s) (75 mL/Hr) IV Continuous <Continuous>  tamsulosin 0.4 milliGRAM(s) Oral at bedtime  traMADol 50 milliGRAM(s) Oral every 8 hours    MEDICATIONS  (PRN):  acetaminophen     Tablet .. 650 milliGRAM(s) Oral every 6 hours PRN Temp greater or equal to 38.5C (101.3F)  ketorolac   Injectable 30 milliGRAM(s) IV Push every 6 hours PRN Severe Pain (7 - 10)  magnesium hydroxide Suspension 30 milliLiter(s) Oral daily PRN Constipation  ondansetron Injectable 4 milliGRAM(s) IV Push every 6 hours PRN Nausea and/or Vomiting  oxyCODONE    IR 5 milliGRAM(s) Oral every 4 hours PRN Moderate Pain (4 - 6)    Vital Signs Last 24 Hrs  T(C): 37.1 (16 May 2025 12:15), Max: 37.4 (16 May 2025 05:47)  T(F): 98.7 (16 May 2025 12:15), Max: 99.4 (16 May 2025 05:47)  HR: 75 (16 May 2025 12:15) (75 - 95)  BP: 106/67 (16 May 2025 12:15) (104/68 - 127/74)  BP(mean): 80 (16 May 2025 12:15) (80 - 92)  RR: 17 (16 May 2025 12:15) (16 - 18)  SpO2: 95% (16 May 2025 12:15) (95% - 100%)    Parameters below as of 16 May 2025 12:15  Patient On (Oxygen Delivery Method): room air    LABS: Reviewed                        9.4    5.55  )-----------( 139      ( 16 May 2025 06:25 )             27.3     05-16    138  |  106  |  7   ----------------------------<  101[H]  3.3[L]   |  25  |  0.59    Ca    8.4      16 May 2025 06:25    Urinalysis Basic - ( 16 May 2025 06:25 )    Color: x / Appearance: x / SG: x / pH: x  Gluc: 101 mg/dL / Ketone: x  / Bili: x / Urobili: x   Blood: x / Protein: x / Nitrite: x   Leuk Esterase: x / RBC: x / WBC x   Sq Epi: x / Non Sq Epi: x / Bacteria: x    CAPILLARY BLOOD GLUCOSE    Radiology: Reviewed  ACC: [id number] EXAM:  XR KNEE 1-2 VIEWS RT   ORDERED BY: JESSICA CODY     PROCEDURE DATE:  05/13/2025      INTERPRETATION:  Frontal and lateral radiographs of the right knee were   performed following arthroplasty.    There are no prior relevant studies for comparison.    The patient has undergone right knee arthroplasty, with the implant   showing good anatomic alignment and there is no sign of immediate   postprocedure complication. There are typical expected findings in and   around thejoint space in the immediate postoperative period, including   fluid, edema and air. Anterior closure staples are present.    IMPRESSION: Successful right knee arthroplasty with no sign of immediate   postprocedure complication.    --- End of Report ---    ISAAC RAM MD; Attending Radiologist  This document has been electronically signed. May 14 2025  1:30PM  ACC: [id number] EXAM:  XR CHEST PORTABLE ROUTINE 1V   ORDERED BY: GRETA MAYBERRY     PROCEDURE DATE:  05/14/2025      INTERPRETATION:  Portable AP chest radiograph    COMPARISON:  NONE.    CLINICAL INFORMATION: Postop fever.    FINDINGS:  CATHETERS AND TUBES: None    PULMONARY:  No airspace consolidations or radiographic evidence of pulmonary nodules..  No pleural effusion or pneumothorax.    HEART/VASCULAR: The heart size and mediastinum configuration are within   the limits of normal.    BONES: The visualized osseous thorax is intact.    IMPRESSION:   No radiographic evidence of active chest disease..    --- End of Report ---    FRANCISCO RODRIGUES MD; Attending Radiologist  This document has been electronically signed. May 15 2025 3:18PM    ORT Score -   Family Hx of substance abuse	Female	      Male  Alcohol 	                                           1                     3  Illegal drugs	                                   2                     3  Rx drugs                                           4 	                  4  Personal Hx of substance abuse		  Alcohol 	                                          3	                  3  Illegal drugs                                     4	                  4  Rx drugs                                            5 	                  5  Age between 16- 45 years	           1                     1  hx preadolescent sexual abuse	   3 	                  0  Psychological disease		  ADD, OCD, bipolar, schizophrenia   2	          2  Depression                                           1 	          1  Total: 3    a score of 3 or lower indicates low risk for opioid abuse		  a score of 4-7 indicates moderate risk for opioid abuse		  a score of 8 or higher indicates high risk for opioid abuse    REVIEW OF SYSTEMS:  CONSTITUTIONAL: No fever or fatigue  HEENT:  No difficulty hearing, no change in vision  NECK: No pain or stiffness  RESPIRATORY: No cough, wheezing, chills or hemoptysis; No shortness of breath  CARDIOVASCULAR: No chest pain, palpitations, dizziness, or leg swelling  GASTROINTESTINAL: No loss of appetite, decreased PO intake. No abdominal or epigastric pain. No nausea, vomiting; No diarrhea or constipation.   GENITOURINARY: No dysuria, frequency, hematuria, retention or incontinence  MUSCULOSKELETAL: + right knee pain and swelling; No muscle, back, or extremity pain, no upper or lower motor strength weakness, no saddle anesthesia, bowel/bladder incontinence, no falls   NEURO: No headaches, No numbness/tingling b/l LE, No weakness  ENDOCRINE: No polyuria, polydipsia, heat or cold intolerance; No hair loss  PSYCHIATRIC: Hx of depression, anxiety, Schizoaffective disorder, no difficulty sleeping    PHYSICAL EXAM:  GENERAL:  Alert & Oriented X4, cooperative, NAD, Good concentration. Speech is clear.   RESPIRATORY: Respirations even and unlabored. Clear to auscultation bilaterally; No rales, rhonchi, wheezing, or rubs  CARDIOVASCULAR: Normal S1/S2, regular rate and rhythm; No murmurs, rubs, or gallops. No JVD.   GASTROINTESTINAL:  Soft, Nontender, Nondistended; Bowel sounds present  PERIPHERAL VASCULAR:  Extremities warm without edema. 2+ Peripheral Pulses, No cyanosis, No calf tenderness  MUSCULOSKELETAL: Motor Strength 5/5 B/L upper and left lower extremities; moves all extremities equally against gravity; ROM decreased to RLE due to surgical pain, + tenderness on palpation of right knee  SKIN: Warm, dry, intact. No rashes, lesions, scars or wounds. Right knee with ace wrap dressing in place, CDI, + ecchymosis noted around right knee area.    Risk factors associated with adverse outcomes related to opioid treatment  [ ]  Concurrent benzodiazepine use  [ ]  History/ Active substance use or alcohol use disorder  [x] Psychiatric co-morbidity  [ ] Sleep apnea  [ ] COPD  [ ] BMI> 35  [ ] Liver dysfunction  [ ] Renal dysfunction  [ ] CHF  [ ] Smoker  [ ]  Age > 60 years    [x]  [place]  Reviewed and Copied to Chart. See below.    Plan of care and goal oriented pain management treatment options were discussed with patient and /or primary care giver; all questions and concerns were addressed and care was aligned with patient's wishes.    Educated patient on goal oriented pain management treatment options     05-16-25 @ 12:37

## 2025-05-16 NOTE — PROGRESS NOTE ADULT - PROBLEM SELECTOR PLAN 1
Pt with acute postoperative right knee pain which is somatic and neuropathic in nature due to surgical procedure. Pt is s/p Right Total Knee Replacement on 5/13. POD#3.  Opioid pain recommendations   - Continue Tramadol 50mg PO q 8 hours. Monitor for sedation/ respiratory depression.   - Continue Oxycodone 5 mg PO q 4 hours PRN moderate pain. Monitor for sedation/ respiratory depression.   Non-opioid pain recommendations   - Continue Toradol 30mg IVP q 6 hours PRN severe pain  - Continue Acetaminophen 1000 mg PO q 8 hours x 3 days. Monitor LFTs  - Continue Celebrex 200mg PO daily  Bowel Regimen  - Continue Miralax 17G PO daily  - Continue Senna 2 tablets at bedtime for constipation  Mild pain   - Non-pharmacological pain treatment recommendations  - Warm/ Cool packs PRN   - Repositioning extremity, elevation, imagery, relaxation, distraction.  - Physical therapy OOB if no contraindications   Recommendations discussed with primary team and RN.  Upon discharge – dc on Celebrex 200mg po daily, and Percocet 5/325mg po q 6 hours prn for 1 week. Pt to take OTC stool softeners.
Pt with acute postoperative right knee pain which is somatic and neuropathic in nature due to surgical procedure. Pt is s/p Right Total Knee Replacement on 5/13. POD#2.  Opioid pain recommendations   - Continue Tramadol 50mg PO q 8 hours. Monitor for sedation/ respiratory depression.   - Continue Oxycodone 5 mg PO q 4 hours PRN moderate pain. Monitor for sedation/ respiratory depression.   Non-opioid pain recommendations   - Continue Toradol 30mg IVP q 6 hours PRN severe pain  - Continue Acetaminophen 1000 mg PO q 8 hours x 3 days. Monitor LFTs  - Continue Celebrex 200mg PO daily  Bowel Regimen  - Continue Miralax 17G PO daily  - Continue Senna 2 tablets at bedtime for constipation  Mild pain   - Non-pharmacological pain treatment recommendations  - Warm/ Cool packs PRN   - Repositioning extremity, elevation, imagery, relaxation, distraction.  - Physical therapy OOB if no contraindications   Recommendations discussed with primary team and RN.  Upon discharge – dc on Celebrex 200mg po daily, and Percocet 5/325mg po q 6 hours prn for 1 week. Pt to take OTC stool softeners.

## 2025-05-16 NOTE — PROGRESS NOTE ADULT - REASON FOR ADMISSION
Scheduled right knee surgery
s/p R TKA on 05/13/2025
Scheduled Right knee surgery
s/p R TKA
s/p R TKA POD#2

## 2025-05-21 ENCOUNTER — APPOINTMENT (OUTPATIENT)
Dept: UROLOGY | Facility: CLINIC | Age: 55
End: 2025-05-21
Payer: MEDICAID

## 2025-05-21 VITALS
TEMPERATURE: 96.8 F | DIASTOLIC BLOOD PRESSURE: 77 MMHG | BODY MASS INDEX: 36.88 KG/M2 | OXYGEN SATURATION: 97 % | WEIGHT: 249 LBS | HEART RATE: 80 BPM | SYSTOLIC BLOOD PRESSURE: 123 MMHG | HEIGHT: 69 IN

## 2025-05-21 DIAGNOSIS — N32.81 OVERACTIVE BLADDER: ICD-10-CM

## 2025-05-21 PROCEDURE — 99213 OFFICE O/P EST LOW 20 MIN: CPT

## 2025-05-21 PROCEDURE — G2211 COMPLEX E/M VISIT ADD ON: CPT | Mod: NC

## 2025-05-21 PROCEDURE — 51798 US URINE CAPACITY MEASURE: CPT

## 2025-05-22 LAB
APPEARANCE: CLEAR
BACTERIA: NEGATIVE /HPF
BILIRUBIN URINE: NEGATIVE
BLOOD URINE: NEGATIVE
CAST: 0 /LPF
COLOR: YELLOW
EPITHELIAL CELLS: 0 /HPF
GLUCOSE QUALITATIVE U: NEGATIVE MG/DL
KETONES URINE: NEGATIVE MG/DL
LEUKOCYTE ESTERASE URINE: NEGATIVE
MICROSCOPIC-UA: NORMAL
NITRITE URINE: NEGATIVE
PH URINE: 7.5
PROTEIN URINE: NEGATIVE MG/DL
RED BLOOD CELLS URINE: 2 /HPF
SPECIFIC GRAVITY URINE: 1.01
UROBILINOGEN URINE: 1 MG/DL
WHITE BLOOD CELLS URINE: 0 /HPF

## 2025-05-27 LAB — BACTERIA UR CULT: NORMAL

## 2025-05-30 LAB — SURGICAL PATHOLOGY STUDY: SIGNIFICANT CHANGE UP

## 2025-06-04 NOTE — DISCHARGE NOTE PROVIDER - NSDCMRMEDTOKEN_GEN_ALL_CORE_FT
[Annual] : an annual visit. aspirin 325 mg oral tablet: 1 tab(s) orally 2 times a day start the day after xarelto is completed. take 2x a day  for 15 days  CeleBREX 200 mg oral capsule: 1 cap(s) orally once a day  dicyclomine 10 mg oral capsule: 1 cap(s) orally 3 times a day  ferrous sulfate 325 mg (65 mg elemental iron) oral delayed release tablet: 1 tab(s) orally 2 times a day  Flomax 0.4 mg oral capsule: 1 cap(s) orally once a day  gabapentin 100 mg oral capsule: 1 cap(s) orally 3 times a day  Ingrezza 40 mg oral capsule: 1 cap(s) orally once a day (at bedtime)  omeprazole 20 mg oral delayed release tablet: 1 tab(s) orally once a day  oxyBUTYnin 5 mg oral tablet: 1 tab(s) orally once a day  pantoprazole 40 mg oral delayed release tablet: 1 tab(s) orally once a day  Percocet 7.5 mg-325 mg oral tablet: 1 tab(s) orally every 6 hours as needed for  severe pain  Polyethylene Glycol 3350: 17 gram(s) orally once a day  propranolol 10 mg oral tablet: 1 tab(s) orally once a day  senna (sennosides) 8.6 mg oral tablet: 2 tab(s) orally once a day (at bedtime)  senna (sennosides) 8.6 mg oral tablet: 1 tab(s) orally 2 times a day  Xarelto 10 mg oral tablet: 1 tab(s) orally once a day take for 12 days after surgery and then start aspirin 325 mg bid after xarelto is completed  ZyPREXA 10 mg oral tablet: 1 tab(s) orally once a day (at bedtime)   aspirin 325 mg oral tablet: 1 tab(s) orally 2 times a day start the day after xarelto is completed. take 2x a day  for 15 days  CeleBREX 200 mg oral capsule: 1 cap(s) orally once a day  dicyclomine 10 mg oral capsule: 1 cap(s) orally 3 times a day  ferrous sulfate 325 mg (65 mg elemental iron) oral delayed release tablet: 1 tab(s) orally 2 times a day  Flomax 0.4 mg oral capsule: 1 cap(s) orally once a day  gabapentin 100 mg oral capsule: 1 cap(s) orally 3 times a day  Ingrezza 40 mg oral capsule: 1 cap(s) orally once a day (at bedtime)  omeprazole 20 mg oral delayed release tablet: 1 tab(s) orally once a day  oxyBUTYnin 5 mg oral tablet: 1 tab(s) orally once a day  Percocet 7.5 mg-325 mg oral tablet: 1 tab(s) orally every 6 hours as needed for  severe pain  Polyethylene Glycol 3350: 17 gram(s) orally once a day  propranolol 10 mg oral tablet: 1 tab(s) orally once a day  senna (sennosides) 8.6 mg oral tablet: 2 tab(s) orally once a day (at bedtime)  Xarelto 10 mg oral tablet: 1 tab(s) orally once a day take for 12 days after surgery and then start aspirin 325 mg bid after xarelto is completed  ZyPREXA 10 mg oral tablet: 1 tab(s) orally once a day (at bedtime)

## (undated) DEVICE — SYR LUER LOK 20CC

## (undated) DEVICE — Device

## (undated) DEVICE — DRAPE TOWEL BLUE 17" X 24"

## (undated) DEVICE — FOR-ESU VALLEYLAB T7E15009DX: Type: DURABLE MEDICAL EQUIPMENT

## (undated) DEVICE — SUT POLYSORB 2-0 30" GS-10 UNDYED

## (undated) DEVICE — VENODYNE/SCD SLEEVE CALF MEDIUM

## (undated) DEVICE — ELCTR AQUAMANTYS BIPOLAR SEALER 6.0

## (undated) DEVICE — ELCTR GROUNDING PAD ADULT COVIDIEN

## (undated) DEVICE — FOR-TOURNIQUET 1200909933: Type: DURABLE MEDICAL EQUIPMENT

## (undated) DEVICE — TAPE SILK 3"

## (undated) DEVICE — SOL IRR BAG NS 0.9% 3000ML

## (undated) DEVICE — DRAPE SHOWER CURTAIN ISOLATION

## (undated) DEVICE — GOWN XXL

## (undated) DEVICE — SUT POLYSORB 1-0 18" HOS-12 UNDYED (POP-OFF)

## (undated) DEVICE — WARMING BLANKET UPPER ADULT

## (undated) DEVICE — STRYKER MIXEVAC 3 BONE CEMENT MIXER

## (undated) DEVICE — SOL INJ NS 0.9% 100ML

## (undated) DEVICE — GLV 8.5 PROTEXIS (BLUE)

## (undated) DEVICE — GLV 8 PROTEXIS (CREAM) MICRO

## (undated) DEVICE — DRAPE LIGHT HANDLE COVER (BLUE)

## (undated) DEVICE — POSITIONER STIRRUP STRAP W SLIP RING 19X3.5"

## (undated) DEVICE — SAW BLADE STRYKER SAGITTAL DUAL CUT 18MMX90MMX1.27MM

## (undated) DEVICE — TOURNIQUET CUFF 34" DUAL PORT W PLC

## (undated) DEVICE — SOL INJ NS 0.9% 500ML 1-PORT

## (undated) DEVICE — SOL IRR POUR H2O 1500ML

## (undated) DEVICE — DRAPE IOBAN 33" X 23"

## (undated) DEVICE — NDL HYPO SAFE 22G X 1.5" (BLACK)

## (undated) DEVICE — HOOD FLYTE STRYKER HELMET SHIELD